# Patient Record
Sex: FEMALE | Race: OTHER | Employment: UNEMPLOYED | URBAN - METROPOLITAN AREA
[De-identification: names, ages, dates, MRNs, and addresses within clinical notes are randomized per-mention and may not be internally consistent; named-entity substitution may affect disease eponyms.]

---

## 2019-07-23 ENCOUNTER — OFFICE VISIT (OUTPATIENT)
Dept: FAMILY MEDICINE CLINIC | Age: 69
End: 2019-07-23

## 2019-07-23 VITALS
BODY MASS INDEX: 36.12 KG/M2 | HEIGHT: 60 IN | RESPIRATION RATE: 16 BRPM | WEIGHT: 184 LBS | TEMPERATURE: 97.7 F | DIASTOLIC BLOOD PRESSURE: 87 MMHG | HEART RATE: 76 BPM | OXYGEN SATURATION: 95 % | SYSTOLIC BLOOD PRESSURE: 131 MMHG

## 2019-07-23 DIAGNOSIS — G56.02 CARPAL TUNNEL SYNDROME OF LEFT WRIST: ICD-10-CM

## 2019-07-23 DIAGNOSIS — G56.02 MEDIAN NERVE COMPRESSION AT ELBOW, LEFT: Primary | ICD-10-CM

## 2019-07-23 RX ORDER — MECLIZINE HCL 25MG 25 MG/1
TABLET, CHEWABLE ORAL
COMMUNITY

## 2019-07-23 RX ORDER — ALPRAZOLAM 0.25 MG/1
TABLET ORAL
COMMUNITY

## 2019-07-23 RX ORDER — SIMVASTATIN 20 MG/1
TABLET, FILM COATED ORAL
COMMUNITY
End: 2019-07-30 | Stop reason: SDUPTHER

## 2019-07-23 RX ORDER — HYDROCHLOROTHIAZIDE 25 MG/1
25 TABLET ORAL DAILY
COMMUNITY
End: 2019-07-30 | Stop reason: SDUPTHER

## 2019-07-23 NOTE — PROGRESS NOTES
Chief Complaint   Patient presents with   [facility] Care     1. Have you been to the ER, urgent care clinic since your last visit? Hospitalized since your last visit? no    2. Have you seen or consulted any other health care providers outside of the [address] [place] [address] since your last visit? Include any pap smears or colon screening. no      Pain on left arm--radiating from shoulder down.  ---elbow seems to bother her the most    Mammogram--5 years ago--normal    Bone density--never had one    Pneumococcal/shingles--has had both--2 years ago    Colonoscopy--4 years ago--normal

## 2019-07-23 NOTE — PATIENT INSTRUCTIONS
Síndrome del tiffanie hartmann: Instrucciones de cuidado - [ Carpal Tunnel Syndrome: Care Instructions ]  Instrucciones de cuidado    El síndrome del tiffanie hartmann es un problema nervioso. Puede causar hormigueo, entumecimiento, debilidad o [employer] dedos, el pulgar y la [place]. El nervio mediano y varios tejidos fibrosos, llamados tendones, atraviesan la colt por un espacio denominado tiffanie hartmann. El movimiento repetido de las albert al trabajar o practicar ciertos pasatiempos y deportes puede hacer presión sobre el nervio. El embarazo y ciertas afecciones médicas, jere la diabetes, la artritis y [facility] tiroides hipoactiva, también pueden causar el síndrome del tiffanie hartmann. Para reducir los síntomas, usted podría limitar [facility] o [facility]. También puede evelio otras medidas para sentirse mejor. Si los síntomas son leves, es probable que pueda aliviar el dolor con 1 o 2 semanas de tratamiento en el hogar. La cirugía es necesaria solo si otros tratamientos no funcionan. La atención de seguimiento es yunior parte clave de dean tratamiento y seguridad. Asegúrese de hacer y acudir a todas las citas, y llame a dean médico si está teniendo problemas. También es yunior buena idea saber los resultados de stacie exámenes y mantener yunior lista de los medicamentos que luda. ¿Cómo puede cuidarse en el hogar? · Si es posible, interrumpa o disminuya la actividad que causa los síntomas. Si no puede suspenderla, joseph pausas frecuentes para descansar y estirarse o cambie la posición de las albert para hacer yunior tarea. Trate de [place] [facility], jere cuando [place] el ratón de yunior computadora. · Trate de evitar doblar o rotar las muñecas. · Pregúntele a dean médico si puede evelio un analgésico (medicamento para el dolor) de venta felipa, jere acetaminofén (Tylenol), ibuprofeno (Advil, Motrin) o naproxeno (Aleve). Sea pastora con los medicamentos. Nohemy y siga todas las instrucciones de la [place].   · Si dean médico le receta corticosteroides para ayudar a aliviar el dolor y reducir la hinchazón, tómelos exactamente jere le fueron recetados. Llame a dean médico si rox estar teniendo problemas con dean medicamento. · Colóquese hielo o yunior compresa fría sobre la [employer] de 10 a 20 minutos cada vez para aliviar el dolor. Póngase un paño labmert entre el hielo y la piel. · Si dean médico o dean fisioterapeuta o terapeuta ocupacional le indica que use yunior tablilla (férula) para la [employer], [place] según las indicaciones para mantener la [employer] en yunior posición neutra. [place] también reduce la presión sobre dean nervio mediano. · Pregúntele a dean médico si debería hacer sesiones de fisioterapia o de terapia ocupacional para aprender a hacer las tareas de [employer]. · [place] clases de yoga para estirar los músculos y fortalecer las albert y las [place]. El yoga ha [employer] síntomas del túnel carpiano. Cómo prevenir el síndrome del túnel carpiano  · Cuando trabaje en la computadora, Osiel Nelson 31 y las muñecas alineadas con los antebrazos. Mantenga los codos cerca de stacie costados. [place] un descanso con yunior frecuencia de 10 a 15 minutos. · Trate de hacer los siguientes ejercicios:  ? Calentamiento: Gire la [place], Sasha Radha y de un lado a otro. Repita esto 4 veces. Estire [employer] dedos, relájelos y vuelva a estirarlos. Repita 4 veces. Estire el pulgar doblándolo levemente hacia atrás, manténgalo en rocio posición y relájelo. Repita 4 veces. ? Estiramiento con los [employer] en posición de orar: Comience colocando las lita de las albert juntas shruti del [place], sona debajo del [place]. Baje las albert lentamente hacia la línea de la cintura y manténgalas cerca del estómago con las lita juntas hasta que sienta un estiramiento leve a moderado debajo de los antebrazos. Mantenga la posición entre 10 y 21 segundos. Repita 4 veces.   ? Estiramiento del flexor de la colt: Extienda el [place] frente a usted, con la urena [place] [facility]. Doble la colt y apunte con la mano hacia el piso. Con la [employer], doble con suavidad la colt aún más hasta que sienta un estiramiento entre leve y moderado en el antebrazo. Mantenga la posición entre 10 y 21 segundos. Repita 4 veces. ? Estiramiento del extensor de la colt: Repita los pasos para el estiramiento del flexor de la colt anton comience con la urena extendida Kalmita K. · [employer]ete yunior pelota de goma varias veces al día para mantener jossie las albert y los dedos. · Evite sostener objetos (jere un libro) en la misma posición chriss mucho tiempo. Siempre que sea posible, utilice toda la mano para evelio un objeto. Si [place] solo el pulgar y el dedo índice puede tensionar la [place] 1. · No fume. Puede empeorar esta afección ya que reduce el flujo de jenae hacia el nervio El paso. Si necesita ayuda para dejar de fumar, hable con dean médico sobre programas y medicamentos para dejar de fumar. Estos pueden aumentar stacie probabilidades de dejar el hábito para siempre. ¿Cuándo debe pedir ayuda? Preste especial atención a los cambios de dean huey y asegúrese de comunicarse con dean médico si:    · El dolor u otros problemas no mejoran con los cuidados en el hogar.     · Desea más información sobre la fisioterapia o la terapia ocupacional.     · Tiene efectos secundarios producidos por los corticosteroides, tales jere:  ? Aumento de [place]. ? Cambios en el estado de ánimo. ? Problemas para dormir. ? Fácil formación de moretones.     · Tiene otros problemas con los medicamentos. ¿Dónde puede encontrar más información en inglés? Olga Wan a http://kiesha-eleuterio.info/. Vera Bucker R432 en la búsqueda para aprender más acerca de \"Síndrome del túnel carpiano: Instrucciones de cuidado - [ Carpal Tunnel Syndrome: Care Instructions ]. \"  Revisado: 20 septiembre, 2018  Versión del contenido: 12.1  © 2452-5880 Healthwise, Incorporated.  Las instrucciones de cuidado fueron adaptadas bajo licencia por [employer] (which disclaims liability or warranty for this information). Si usted tiene [place] afección médica o sobre estas instrucciones, siempre pregunte a kahn profesional de huey. [facility], Incorporated niega toda garantía o responsabilidad por kahn uso de esta información. Codo: Ejercicios - [ Elbow: Exercises ]  Instrucciones de cuidado  Aquí se presentan algunos ejemplos de ejercicios para el codo. Empiece cada ejercicio lentamente. Reduzca la intensidad del ejercicio si Elian Kathy a tener dolor. Kahn médico o fisioterapeuta le dirán cuándo puede comenzar con estos ejercicios y cuáles funcionarán mejor para usted. Cómo hacer los ejercicios  Estiramiento del flexor de la oclt    1. Extienda el brazo shruti de usted con la urena Filomena Harriett alvareziba. 2. Doble la colt y apunte con la mano hacia el piso. 3. Con la otra mano, doble con suavidad la colt aún más hasta que sienta un estiramiento entre leve y moderado en el antebrazo. 4. Sosténgalo por lo menos de 15 a 30 segundos. Repita de 2 a 4 veces. Estiramiento del extensor de la colt    1. Repita los pasos del 1 al 4 del estiramiento anterior, anton comience con la urena de la mano extendida [place] K. Apretar yunior pelota o un calcetín    1. Sostenga ynuior pelota de tenis (o un calcetín enrollado) en la mano. 2. Cierre el puño alrededor de la pelota (o el calcetín) y apriételo. 3. [address] [place] [address] Road unos 6 segundos y luego relájelo chriss 10 segundos. 4. Repita de 8 a 12 veces. 5. Cambie la pelota (o el calcetín) a la otra mano y joseph lo mismo de 8 a 12 veces. Desviación de la colt    1. Siéntese de Courtney Malikid que kahn brazo quede apoyado anton kahn mano cuelgue del borde de yunior superficie plana, jere yunior mckeon. 2. Extienda la mano jere si le estuviera dando la mano a alguien. 3. Mueva la mano hacia judah y Juan K. 4. Repita kyle movimiento de 8 a 12 veces. 5. Cambie de brazo.   6. Trate de [employer] ejercicio dos veces con cada mano. Flexiones con la colt    1. Coloque el antebrazo en yunior mckeon con la mano colgando sobre el borde de la mckeon y la urena [place] arriba. 2. Coloque yuinor pesa de 1 a 2 libras (0.5 a 1 kg) en la mano. Ésta puede ser Antonette Saas pesa tipo [facility], yunior price de comida o yunior botella llena de agua. 3. Lentamente levante y baje la pesa, manteniendo el antebrazo en la mckeon y la urena [place] ar[place]. 4. Repita kyle movimiento de 8 a 12 veces. 5. Cambie de brazo y [employer] del 1 al 4.  6. Repítalo con la mano hacia abajo, [place]. Cambie de brazo. Flexiones de bíceps    1. Siéntese inclinado hacia adelante, con las piernas ligeramente separadas y la mano izquierda sobre el muslo dena. 2. Coloque el codo derecho en dean muslo derecho y sostenga la pesa con dean antebrazo en posición horizontal.  3. Doble lentamente la pesa [place] arriba y [place]. 4. Repita kyle movimiento de 8 a 12 veces. 5. Cambie de brazo y [employer] del 1 al 4. La atención de seguimiento es yunior parte clave de dean tratamiento y seguridad. Asegúrese de hacer y acudir a todas las citas, y llame a dean médico si está teniendo problemas. También es yunior buena idea saber los resultados de stacie exámenes y mantener yunior lista de los medicamentos que luda. ¿Dónde puede encontrar más información en inglés? Roscoe Dominguez a http://kiesha-eleuterio.info/. Avel Katz P520 en la búsqueda para aprender más acerca de \"Codo: Ejercicios - [ Elbow: Exercises ]. \"  Revisado: 20 septiembre, 2018  Versión del contenido: 12.1  © 9630-4043 Healthwise, [place]. Las instrucciones de cuidado fueron adaptadas bajo licencia por [employer] (which disclaims liability or warranty for this information). Si usted tiene [place] afección médica o sobre estas instrucciones, siempre pregunte a dean profesional de huey.  [employer], Friday niega toda garantía o responsabilidad por dean uso de esta información. Uso de yunior tablilla: Instrucciones de cuidado  [Wearing a Splint: Care Instructions]  Instrucciones de cuidado     [place] tablilla (férula) protege un hueso roto u otra Demar Jeffrey. Si tiene yunior tablilla removible, siga las instrucciones de dean médico y [place] solo si el médico le indica que puede [place]. La mayoría de las tablillas se pueden ajustar. El médico le mostrará cómo hacerlo y le dirá cuándo puede ser necesario que usted ajuste la tablilla. Muchas tablillas vienen ya hechas. Dean médico podría [employer] tablilla a base de yeso o de fibra de renee. Algunas tablillas tienen incorporada yunior almohadilla de aire. Las almohadillas de aire se inflan para mantener la michaelle lesionada en dean lugar. La atención de seguimiento es yunior parte clave de dean tratamiento y seguridad. Asegúrese de hacer y acudir a todas las citas, y llame a dean médico si está teniendo problemas. También es yunior buena idea saber los resultados de stacie exámenes y mantener yunior lista de los medicamentos que luda. ¿Cómo puede cuidarse en el hogar? Cuidado general  · No ponga peso sobre la tablilla. Si lleva yunior bota ortopédica, el médico le indicará cuándo puede poner peso [place] Para. · Si los dedos de la mano o el pie del miembro que lleva la tablilla no están lesionados, muévalos de vez en cuando. [place] ayuda a que [employer] y los líquidos del miembro lesionado. · Eleve la pierna o el brazo lesionado sobre yunior almohada cuando le aplique hielo o en cualquier momento en que esté sentado o acostado chriss los 3 días siguientes. Trate de mantenerlo por encima del nivel del corazón. [place] ayudará a reducir la hinchazón. · Aplíquese hielo o compresas frías en la michaelle lesionada de 10 a 20 minutos cada vez. Trate de hacerlo cada 1 o 2 horas chriss los siguientes 3 días (cuando esté despierto) o hasta que baje la hinchazón. Tenga cuidado de no mojar la tablilla. · Sea pastora con los medicamentos.  Nohemy y siga todas las instrucciones de la etiqueta. ¨ Si el médico le recetó un analgésico (medicamento para el dolor), tómelo según las indicaciones. ¨ Si no está tomando un analgésico recetado, pregúntele a dean médico si puede evelio rena de [place]. · Mantenga dean fuerza y albertina muscular tanto jere le sea posible a la vez que protege dean miembro o articulación lesionados. Es posible que el médico le indique que tense y relaje los músculos que están protegidos por la tablilla. Consulte con dean médico o con dean fisioterapeuta o terapeuta ocupacional para recibir instrucciones. Cómo cuidar de la tablilla y de la piel  · Si dean tablilla no se judith, trate de soplar aire fresco dentro de la tablilla con un secador de pelo o un ventilador para ayudar a aliviar la picazón. Nunca introduzca objetos debajo de la tablilla para rascarse la piel. · No use aceites ni lociones cerca de la tablilla. Si le duele o se le enrojece la piel alrededor del borde de la tablilla, puede acolchar los bordes con un material [place], jere rolan \"moleskin\", o usar cinta para cubrirlos. · Si tiene permiso para quitarse la tablilla, asegúrese de que la piel esté seca antes de volver a ponérsela. Tenga cuidado de no ponerse la tablilla demasiado apretada. El agua y dean tablilla  · Mantenga seca la tablilla. La humedad se puede acumular debajo de la tablilla y causar irritación y picazón en la piel. Si tiene Colie Johan herida o tuvo yunior operación, tener humedad bajo la tablilla puede aumentar el riesgo de [place]. · Cubra la tablilla con plástico y sujételo con cinta adhesiva cuando se bañe o se duche, excepto si el médico le ha dicho que se la puede quitar cuando se bañe. · Si se puede quitar la tablilla chriss el baño, seque la michaelle con ligeras palmaditas después de bañarse y vuelva a ponerse la tablilla. · Si la tablilla se moja un poco, puede secarla con un secador de pelo. Utilice el nivel de aire frío (\"cool\"). ¿Cuándo debe pedir ayuda?   Llame a dean médico ahora mismo o busque atención médica inmediata si:  · Tiene más dolor o dolor intenso. · Siente yunior michaelle caliente o adolorida debajo de la tablilla. · Tiene problemas con la tablilla. Por ejemplo:  ¨ Siente ardor o escozor en la piel cubierta por la tablilla. ¨ La tablilla se siente demasiado apretada. ¨ Tiene mucha hinchazón cerca de la tablilla. (Algo de hinchazón es normal). ¨ Tiene fiebre nueva. ¨ Hay secreción o mal Verizon de la tablilla. · La mano o el pie está frío o pálido, o cambia de color. · Tiene problemas para  los dedos de la mano o del pie. · Tiene síntomas de un coágulo de jenae en el brazo o la pierna (llamado trombosis venosa profunda). Estos pueden incluir:  ¨ Dolor en el brazo, la pantorrilla, detrás de la rodilla, en el muslo o en la sera. ¨ Enrojecimiento e hinchazón en el brazo, la pierna o la sera. Vigile muy de cerca los cambios en dean huey, y asegúrese de comunicarse con dean médico si:  · La tablilla se está rompiendo o está perdiendo dean forma. · No mejora jere se esperaba. ¿Dónde puede encontrar más información en inglés? Vaya a DealExplorer.be  Ashley E815 en la búsqueda para aprender más acerca de \"Uso de yunior tablilla: Instrucciones de cuidado. \"   © 3538-8178 Healthwise, Incorporated. Instrucciones de cuidado adaptadas bajo licencia por [facility] (which disclaims liability or warranty for this information). Estas instrucciones de cuidado son para usarlas con dean profesional clínico registrado. Si tiene preguntas acerca de yunior afección médica o de estas instrucciones, pregunte siempre a dean profesional de [employer]. Healthwise, Incorporated niega cualquier garantía o responsabilidad por dean uso de esta información.   Versión del contenido: [id number]; Revisado: 16 agosto, 2015

## 2019-07-23 NOTE — PROGRESS NOTES
Chief Complaint   Patient presents with    Establish Care   :    HPI  Rachael Crigler is a 68 y.o. female who presents with c/o left elbow pain,  acute onset, 3 months and stable. the context: no trauma. No change in activity  Pain anterior side of elbow, intensity 10/10 , sharp, with radiation from shoulder to tip of finger, worsened by supination,. Pain comes only with movement. and relieved by rest. Associated symptoms are : things falling of hand sometimes, numbness. Patient has tried Aleeve with mild relief. Patient denies fever, chills , neck pain, CP/ SOB/ N/V/ diarrhea. Allergies - reviewed: Allergies   Allergen Reactions    Penicillins Itching       Meds - reviewed:   Current Outpatient Medications   Medication Sig Dispense Refill    meclizine (ANTIVERT) 25 mg chewable tablet Take  by mouth three (3) times daily as needed.  ALPRAZolam (XANAX) 0.25 mg tablet Take  by mouth.  hydroCHLOROthiazide (HYDRODIURIL) 25 mg tablet Take 25 mg by mouth daily.  simvastatin (ZOCOR) 20 mg tablet Take  by mouth nightly. PMH- reviewed:  Past Medical History:   Diagnosis Date    Anxiety     Hypercholesterolemia     Hypertension        SH- reviewed:  Smoker:  Social History     Tobacco Use   Smoking Status Not on file       ETOH- reviewed:   Social History     Substance and Sexual Activity   Alcohol Use Not on file       FH- reviewed:   No family history on file. ROS: Positive for Items in bold:   Constitutional: headache, fever, fatigue, weight loss or weight gain      Cardiac: chest pain      : frequency, urgency, dysuria, hematuria   Neuro: dizziness, numbness in hand, tingling  Psych: anxiety, depression, stress     Physical Exam:  Visit Vitals  /87   Pulse 76   Temp 97.7 °F (36.5 °C) (Oral)   Resp 16   Ht 5' (1.524 m)   Wt 184 lb (83.5 kg)   SpO2 95%   BMI 35.94 kg/m²       Gen: No apparent distress. Alert and oriented and responds to all questions appropriately. Neck: Supple; full ROM. No cervical spine tenderness. Neurologic:Coordination and gait grossly intact. LUExt: normal strength, and sensation grossly intact. Tenderness palpation on marcelino medial side of left. Elbow. Tinnel positive in the 1st 3 lateral fingers. DTR 2+ Well perfused. No edema. Skin: No obvious rashes. I have personally reviewed the labs results    HM reviewed and summarized below:   Mammogram--5 years ago--normal  Bone density--never had one  Pneumococcal/shingles--has had both--2 years ago  Colonoscopy--4 years ago--normal      Assessment and Plan:   Silvino Soto is a 68 y.o. female who presents for left elbow pain. Likely median nerve compression. Will treat conservatively for now. will give some home exercise. Advised wrist splints. Consider EMG/NCS. PT referrla given. Will follow with sport medicine       ICD-10-CM ICD-9-CM    1. Median nerve compression at elbow, left G56.02 354.1 REFERRAL TO PHYSICAL THERAPY   2. Carpal tunnel syndrome of left wrist G56.02 354.0 REFERRAL TO PHYSICAL THERAPY       willl obtain medical records from previous PCP    Discussed diagnoses in detail with patient. Patient expressed understanding of and agreement to above plan. All questions and concerns addressed. Medication risks/benefits/side effects discussed with patient. Patient is counseled to return to the office and/or ED if symptoms do not improve as expected. Patient  discussed with Dr. Mason Kirby, Attending Physician. Lydia Dumont MD  07/23/19    Family Medicine Resident

## 2019-07-30 ENCOUNTER — OFFICE VISIT (OUTPATIENT)
Dept: FAMILY MEDICINE CLINIC | Age: 69
End: 2019-07-30

## 2019-07-30 VITALS
TEMPERATURE: 98 F | HEART RATE: 64 BPM | OXYGEN SATURATION: 97 % | WEIGHT: 184 LBS | HEIGHT: 60 IN | BODY MASS INDEX: 36.12 KG/M2 | SYSTOLIC BLOOD PRESSURE: 131 MMHG | DIASTOLIC BLOOD PRESSURE: 82 MMHG | RESPIRATION RATE: 18 BRPM

## 2019-07-30 DIAGNOSIS — M25.522 LEFT ELBOW PAIN: ICD-10-CM

## 2019-07-30 DIAGNOSIS — G56.02 CARPAL TUNNEL SYNDROME OF LEFT WRIST: Primary | ICD-10-CM

## 2019-07-30 RX ORDER — HYDROCHLOROTHIAZIDE 25 MG/1
25 TABLET ORAL DAILY
Qty: 30 TAB | Refills: 0 | Status: SHIPPED | OUTPATIENT
Start: 2019-07-30 | End: 2019-09-17 | Stop reason: SDUPTHER

## 2019-07-30 RX ORDER — SIMVASTATIN 20 MG/1
20 TABLET, FILM COATED ORAL
Qty: 30 TAB | Refills: 0 | Status: SHIPPED | OUTPATIENT
Start: 2019-07-30 | End: 2019-09-17 | Stop reason: SDUPTHER

## 2019-07-30 NOTE — PROGRESS NOTES
History of Present Illness     Patient Identification  Silvino Soto is a 68 y.o. female complains of pain in the left elbow. Pt states the pain started 3 months ago and and radiates down to her fingers w/ numbness and tingling in the first 3 digits. Pain is located at the medial epicondyle and worse with pronation and supination. Has tried aleve and icy hot which provides temporary relief. Denies trauma, edema, erythema, or weakness. Past Medical History:   Diagnosis Date    Anxiety     Hypercholesterolemia     Hypertension      No family history on file. Current Outpatient Medications   Medication Sig Dispense Refill    meclizine (ANTIVERT) 25 mg chewable tablet Take  by mouth three (3) times daily as needed.  ALPRAZolam (XANAX) 0.25 mg tablet Take  by mouth.  hydroCHLOROthiazide (HYDRODIURIL) 25 mg tablet Take 25 mg by mouth daily.  simvastatin (ZOCOR) 20 mg tablet Take  by mouth nightly. Allergies   Allergen Reactions    Penicillins Itching       Review of Systems  Pertinent items are noted in HPI. Physical Exam     There were no vitals taken for this visit. GEN: Well appearing. No apparent distress. Responds to all questions appropriately. Lungs: No labored respirations. Talking in complete sentences without difficulty.   Elbow/Wrist: left    Elbow/wrist Effusion: None  Deformity: None     ROM: FROM at elbow and wrist    Palpation:   TFCC tenderness: None   Ulna styloid tenderness: None   Distal radius tenderness: None   Medial epicondyle tenderness: positive   Lateral epicondyle tenderness: None    Strength (0-5/5):   :    Left: 5/5  Right: 5/5   Wrist Extension:  Left: 5/5  Right: 5/5   Wrist Flexion:  Left: 5/5 Pain over the medial elbow  Right: 5/5   Elbow Flextion: Left: 5/5  Right: 5/5   Elbow Extension:  Left: 5/5  Right: 5/5   Forearm Pronation:  Left: 5/5 Pain in the anterior elbow with testing but no radiation sx  Right: 5/5    Forearm Supination: Left: 5/5  Right: 5/5    Other test:  Wrist:  Phalens test: Positive  Tinels test: Positive    Elbow:  Phalens test: Positive  Tinels test: negative    Assessment:  Elbow pain: Possible medial epicondylitis. Pronator teres syndrome also a possibility. Hand Wrist Pain: Possible CTS. Could also be related to pronator teres syndrome. Plan:  1. Home Exercise Program as per handout. 2. Ice 15 minutes, three times a day PRN and after exercise. Can alternate with heat for 15 minutes. 3. Wrist brace     Medications:    1. Naproxin (Aleve): 220mg 1-2 tablets twice a day PRN. 2. Acetaminophen (Tylenol):  500mg 1-2 tablets every 6 hours as needed for pain.     RTC: 4 weeks

## 2019-08-09 ENCOUNTER — HOSPITAL ENCOUNTER (OUTPATIENT)
Dept: LAB | Age: 69
Discharge: HOME | End: 2019-08-09
Payer: MEDICARE

## 2019-08-09 ENCOUNTER — OFFICE VISIT (OUTPATIENT)
Dept: FAMILY MEDICINE CLINIC | Age: 69
End: 2019-08-09

## 2019-08-09 VITALS
TEMPERATURE: 98.3 F | SYSTOLIC BLOOD PRESSURE: 111 MMHG | BODY MASS INDEX: 36.32 KG/M2 | RESPIRATION RATE: 18 BRPM | DIASTOLIC BLOOD PRESSURE: 74 MMHG | OXYGEN SATURATION: 97 % | HEART RATE: 76 BPM | WEIGHT: 185 LBS | HEIGHT: 60 IN

## 2019-08-09 DIAGNOSIS — M25.522 LEFT ELBOW PAIN: ICD-10-CM

## 2019-08-09 DIAGNOSIS — G56.02 CARPAL TUNNEL SYNDROME OF LEFT WRIST: ICD-10-CM

## 2019-08-09 DIAGNOSIS — R42 DIZZINESS: ICD-10-CM

## 2019-08-09 DIAGNOSIS — I10 ESSENTIAL HYPERTENSION: ICD-10-CM

## 2019-08-09 DIAGNOSIS — M77.02 MEDIAL EPICONDYLITIS OF LEFT ELBOW: ICD-10-CM

## 2019-08-09 DIAGNOSIS — E78.2 MIXED HYPERLIPIDEMIA: ICD-10-CM

## 2019-08-09 DIAGNOSIS — Z00.00 WELL WOMAN EXAM (NO GYNECOLOGICAL EXAM): Primary | ICD-10-CM

## 2019-08-09 DIAGNOSIS — Z13.1 SCREENING FOR DIABETES MELLITUS: ICD-10-CM

## 2019-08-09 PROCEDURE — 36415 COLL VENOUS BLD VENIPUNCTURE: CPT

## 2019-08-09 PROCEDURE — 85025 COMPLETE CBC W/AUTO DIFF WBC: CPT

## 2019-08-09 PROCEDURE — 80061 LIPID PANEL: CPT

## 2019-08-09 PROCEDURE — 80053 COMPREHEN METABOLIC PANEL: CPT

## 2019-08-09 PROCEDURE — 83036 HEMOGLOBIN GLYCOSYLATED A1C: CPT

## 2019-08-09 PROCEDURE — 86803 HEPATITIS C AB TEST: CPT

## 2019-08-09 NOTE — PROGRESS NOTES
Chief Complaint   Patient presents with   [facility] Elbow Pain     [facility]  # [id number]     5. Have you been to the ER, urgent care clinic since your last visit? Hospitalized since your last visit? No    2. Have you seen or consulted any other health care providers outside of the [address] [place] [address] since your last visit? Include any pap smears or colon screening. No     Reviewed record in preparation for visit and have obtained necessary documentation.

## 2019-08-09 NOTE — PATIENT INSTRUCTIONS
Visita de control para mujeres  Instrucciones de cuidado - [ Well Visit, Women 48 to 72: Care Instructions ]  Instrucciones de cuidado    Los exámenes físicos pueden ayudarla a mantenerse saludable. Dean médico dubose revisado dean estado general de huey y podría haberle dado algunos consejos para cuidarse. También podría haberle recomendado otros exámenes. En dean hogar, usted puede ayudar a prevenir enfermedades si come de manera saludable, hace ejercicio con regularidad y sigue otras recomendaciones. La atención de seguimiento es yunior parte clave de dean tratamiento y seguridad. Asegúrese de hacer y acudir a todas las citas, y llame a dean médico si está teniendo problemas. También es yunior buena idea saber los resultados de stacie exámenes y mantener yunior lista de los medicamentos que luda. ¿Cómo puede cuidarse en el hogar? · Alcance un peso saludable y [place]. [place] disminuirá el riesgo de tener [employer], tales jere obesidad, diabetes, enfermedad cardíaca y presión arterial colette. · Nicole al menos 30 minutos de ejercicio la mayoría de los días de la Nursery. Caminar es yunior buena opción. Es posible que prefiera hacer otras actividades, jere correr, nadar, [employer], o jugar al tenis u otros deportes de equipo. · No fume. Fumar puede [employer] problemas de la huey. Si necesita ayuda para dejar de fumar, hable con dean médico sobre programas y medicamentos para dejar de fumar. Pueden aumentar stacie probabilidades de dejar el hábito para siempre. · Protéjase la piel del exceso de sol. [address] 10 a.m. y las 4 p.m., permanezca a la varun o Mcdonnell Cheng con prendas de vestir y un sombrero de ala ancha. Use gafas de sol que bloqueen los gokul ultravioleta. Póngase un protector solar de amplio espectro (SPF 30 o superior) en la piel expuesta, incluso cuando esté nublado. · Acuda al dentista ДМИТРИЙ [facility] veces al año para hacerse chequeos y limpiezas dentales.   · En el automóvil, use el cinturón de seguridad. Siga las recomendaciones de dean médico acerca de cuándo hacerse determinadas pruebas. Estas pruebas pueden detectar problemas temprano. · Colesterol. Dean médico le avisará con qué frecuencia debe hacerse esta prueba según dean edad, stacie antecedentes familiares y otros factores que puedan incrementar el riesgo de ataque al corazón y ataque cerebral.  · Presión arterial. Hágase evelio la presión arterial chriss yunior visita de rutina al médico. Dean médico le dirá con qué frecuencia debe revisarse la presión arterial según dean edad, los Early de dean presión arterial y otros factores. · Mamografía. Pregúntele a dean médico con qué frecuencia debe hacerse [employer], la cual es yunior radiografía (gokul X) de los senos (mamas). Yunior mamografía puede detectar el cáncer de seno antes de que pueda palparse y en la etapa en que es más fácil de tratar. · Prueba de Papanicolaou y examen pélvico. Pregúntele a dean médico con qué frecuencia debe hacerse yunior prueba de Papanicolaou. Es posible que no tenga que hacerse yunior prueba de Papanicolaou con la misma frecuencia de antes. · Visión. Hágase un chequeo de la visión cada rena o [address], o con la frecuencia que dean médico sugiera. Algunos expertos recomiendan que se joseph exámenes anuales para detectar glaucoma u otros problemas de la visión relacionados con la edad después de los 50 Los dianne. · Audición. Avísele a dean médico si nota algún cambio en dean audición. Usted puede hacerse pruebas para saber si oye siobhan. · Diabetes. Pregúntele a dean médico si debería hacerse pruebas para la diabetes. · Cáncer colorrectal. Dean riesgo de cáncer colorrectal aumenta a medida que envejece. Algunos especialistas dicen que los adultos deberían comenzar a hacerse pruebas de detección regulares a los 48 años de edad y dejar de hacérselas a los 76 años. Otros dicen que hay que comenzar antes de los 48 años o continuar después de los 76 años de edad.  Hable con dean médico acerca de dean riesgo y de cuándo comenzar y dejar de hacerse pruebas de detección. · Enfermedad de la glándula tiroidea. Hable con kahn médico acerca de si debe examinarse la glándula tiroidea jere parte de kahn examen físico de rutina. Las [employer] tienen más probabilidad de tener problemas con la glándula tiroidea. · Osteoporosis. Edward Pintos a hacerse pruebas de densidad ósea a los 72 años. Si es yony de [facility], pregúntele a kahn médico si tiene factores que pueden aumentar el riesgo de esta enfermedad. [employer] prueba antes de los 72 Los dianne. · Riesgo de ataque al corazón y ataque cerebral. Con yunior frecuencia de al menos 4 a 6 años, debería hacerse evaluar kahn riesgo de tener un ataque al corazón y un ataque cerebral. Kahn médico tiene en cuenta factores jere la edad, la presión arterial, el colesterol, si es fumador o si tiene diabetes para mostrar cuál es kahn riesgo de tener un ataque al corazón o un ataque cerebral en los próximos 10 años. ¿Cuándo debe pedir ayuda? Preste especial atención a los cambios en kahn huey y asegúrese de comunicarse con kahn médico si tiene problemas o síntomas que le preocupan. ¿Dónde puede encontrar más información en inglés? Jared Espinoza a http://kiesha-eleuterio.info/. Sonu Avalos I231 en la búsqueda para aprender más acerca de \"Visita de control para mujeres de 50 a 72 años: Instrucciones de cuidado - [ Well Visit, Women 48 to 72: Care Instructions ]. \"  Revisado: 13 glen, 2018  Versión del contenido: 12.1  © 3216-3019 Healthwise, Incorporated. Las instrucciones de cuidado fueron adaptadas bajo licencia por [employer] (which disclaims liability or warranty for this information). Si usted tiene [place] afección médica o sobre estas instrucciones, siempre pregunte a kahn profesional de huey. [facility] niega toda garantía o responsabilidad por kahn uso de esta información.

## 2019-08-09 NOTE — PROGRESS NOTES
Chief Complaint   Patient presents with    Elbow Pain     [facility]  # [id number]   :    JONE  Shashi Dukes is a 68 y.o. female who presents to follow up on left elbow pain. CTS vs medial epicondylitis. Wrist splintis helping. No change in activity  Pain anterior side of elbow, intensity down from 10- 6/10. Patient has tried Aleeve with mild relief. Patient denies fever, chills , neck pain, CP/ SOB/ N/V/ diarrhea. diet: regular. exercise: walks 30 min daily. last 3 paps were within normal limits  Does not check BP at home. No recent falls, but describes intermittent random dizziness as room spinning, ongoing for 10 years, about once a month. Loves to watch TV      Allergies - reviewed: Allergies   Allergen Reactions    Penicillins Itching       Meds - reviewed:   Current Outpatient Medications   Medication Sig Dispense Refill    pneumococcal 13 derrick conj dip (PREVNAR-13) 0.5 mL syrg injection 0.5 mL by IntraMUSCular route once for 1 dose. 0.5 mL 0    varicella-zoster recombinant, PF, (SHINGRIX, PF,) 50 mcg/0.5 mL susr injection 0.5 mL by IntraMUSCular route once for 1 dose. 0.5 mL 0    hydroCHLOROthiazide (HYDRODIURIL) 25 mg tablet Take 1 Tab by mouth daily. 30 Tab 0    simvastatin (ZOCOR) 20 mg tablet Take 1 Tab by mouth nightly. 30 Tab 0    meclizine (ANTIVERT) 25 mg chewable tablet Take  by mouth three (3) times daily as needed.  ALPRAZolam (XANAX) 0.25 mg tablet Take  by mouth. PMH- reviewed:  Past Medical History:   Diagnosis Date    Anxiety     Hypercholesterolemia     Hypertension        SH- reviewed:  Smoker:  Social History     Tobacco Use   Smoking Status Never Smoker   Smokeless Tobacco Never Used       ETOH- reviewed:   Social History     Substance and Sexual Activity   Alcohol Use Never    Frequency: Never       FH- reviewed:   No family history on file.       ROS: Positive for Items in bold:   Constitutional: headache, fever, fatigue, weight loss or weight gain      Cardiac: chest pain      : frequency, urgency, dysuria, hematuria   Neuro: dizziness, numbness in hand, tingling  Psych: anxiety, depression, stress     Physical Exam:  Visit Vitals  /74 (BP 1 Location: Right arm, BP Patient Position: Sitting)   Pulse 76   Temp 98.3 °F (36.8 °C) (Oral)   Resp 18   Ht 5' (1.524 m)   Wt 185 lb (83.9 kg)   SpO2 97%   BMI 36.13 kg/m²       General  no distress, well developed, well nourished  HEENT  oropharynx clear and moist mucous membranes  Eyes  PERRL, EOMI and Conjunctivae Clear Bilaterally  Neck   full range of motion and supple  Respiratory  Clear Breath Sounds Bilaterally, No Increased Effort and Good Air Movement Bilaterally  Cardiovascular   RRR, S1S2, No murmur and Radial/Pedal Pulses 2+/=  Abdomen  soft, non tender and non distended  Skin  No Rash and Cap Refill less than 3 sec  Musculoskeletal no swelling or tenderness and strength normal and equal bilaterally  Neurologic:Coordination and gait grossly intact. LUExt: normal strength, and sensation grossly intact. Wearing wrist brace. Tenderness palpation on marcelino medial side of left. Elbow. Tinnel positive in the 1st 3 lateral fingers. DTR 2+ Well perfused. No edema. Skin: No obvious rashes. Assessment and Plan:   Saturnino Smyth is a 68 y.o. female who presents for follow up on  left elbow pain. Medial epicondilytis pronator teres syndrome vs  CTS improving. Continue conservative management, home exercise. Continue wrist splints. PT referral given had been given. Will follow with sport medicine   She wanted to do her wellness at the same time. Diet and exercise discussed Diet and exercise discussed. HM reviewed and updated. Patient encouraged to eat healthy (plenty of fruits and vegetables) and get regular cardiovascular exercise (at least 150 minutes per week). Stay active and limit screen time. Labs, immunization and screening as below.    Risk for fall due to dizziness: will RTC for dedicated evaluation for vertigo. Many need to stop Xanax Discussed fall prevention. Use a cane        ICD-10-CM ICD-9-CM    1. Well woman exam (no gynecological exam) Z00.00 V70.0 COLONOSCOPY      HEPATITIS C AB      TREVOR MAMMO BI SCREENING INCL CAD      REFERRAL TO OPHTHALMOLOGY      pneumococcal 13 derrick conj dip (PREVNAR-13) 0.5 mL syrg injection      varicella-zoster recombinant, PF, (SHINGRIX, PF,) 50 mcg/0.5 mL susr injection   2. Left elbow pain M25.522 719.42    3. Carpal tunnel syndrome of left wrist G56.02 354.0    4. Medial epicondylitis of left elbow M77.02 726.31    5. BMI 36.0-36.9,adult E80.74 F26.08 METABOLIC PANEL, COMPREHENSIVE      HEMOGLOBIN A1C WITH EAG   6. Essential hypertension K09 355.9 METABOLIC PANEL, COMPREHENSIVE      HEMOGLOBIN A1C WITH EAG      REFERRAL TO OPHTHALMOLOGY   7. Mixed hyperlipidemia E78.2 272.2 LIPID PANEL      HEMOGLOBIN A1C WITH EAG   8. Dizziness R42 780.4 CBC WITH AUTOMATED DIFF      HEMOGLOBIN A1C WITH EAG   9. Screening for diabetes mellitus Z13.1 V77.1 HEMOGLOBIN A1C WITH EAG         Discussed diagnoses in detail with patient. Patient expressed understanding of and agreement to above plan. All questions and concerns addressed. Medication risks/benefits/side effects discussed with patient. Patient is counseled to return to the office and/or ED if symptoms do not improve as expected. Patient  discussed with Dr. Yesica Deluna, Attending Physician. Lydia Dumont MD  08/09/19    Family Medicine Resident  Likes watching TV.

## 2019-08-10 LAB
ALBUMIN SERPL-MCNC: 4.1 G/DL (ref 3.5–4.8)
ALBUMIN/GLOB SERPL: 1.2 {RATIO} (ref 1.2–2.2)
ALP SERPL-CCNC: 97 IU/L (ref 39–117)
ALT SERPL-CCNC: 15 IU/L (ref 0–32)
AST SERPL-CCNC: 21 IU/L (ref 0–40)
BASOPHILS # BLD AUTO: 0.1 X10E3/UL (ref 0–0.2)
BASOPHILS NFR BLD AUTO: 1 %
BILIRUB SERPL-MCNC: <0.2 MG/DL (ref 0–1.2)
BUN SERPL-MCNC: 18 MG/DL (ref 8–27)
BUN/CREAT SERPL: 26 (ref 12–28)
CALCIUM SERPL-MCNC: 9.7 MG/DL (ref 8.7–10.3)
CHLORIDE SERPL-SCNC: 102 MMOL/L (ref 96–106)
CHOLEST SERPL-MCNC: 158 MG/DL (ref 100–199)
CO2 SERPL-SCNC: 24 MMOL/L (ref 20–29)
CREAT SERPL-MCNC: 0.7 MG/DL (ref 0.57–1)
EOSINOPHIL # BLD AUTO: 0.7 X10E3/UL (ref 0–0.4)
EOSINOPHIL NFR BLD AUTO: 6 %
ERYTHROCYTE [DISTWIDTH] IN BLOOD BY AUTOMATED COUNT: 14.5 % (ref 12.3–15.4)
EST. AVERAGE GLUCOSE BLD GHB EST-MCNC: 140 MG/DL
GLOBULIN SER CALC-MCNC: 3.5 G/DL (ref 1.5–4.5)
GLUCOSE SERPL-MCNC: 97 MG/DL (ref 65–99)
HBA1C MFR BLD: 6.5 % (ref 4.8–5.6)
HCT VFR BLD AUTO: 39.5 % (ref 34–46.6)
HCV AB S/CO SERPL IA: <0.1 S/CO RATIO (ref 0–0.9)
HDLC SERPL-MCNC: 52 MG/DL
HGB BLD-MCNC: 12.4 G/DL (ref 11.1–15.9)
IMM GRANULOCYTES # BLD AUTO: 0 X10E3/UL (ref 0–0.1)
IMM GRANULOCYTES NFR BLD AUTO: 0 %
INTERPRETATION, 910389: NORMAL
LDLC SERPL CALC-MCNC: 77 MG/DL (ref 0–99)
LYMPHOCYTES # BLD AUTO: 3.8 X10E3/UL (ref 0.7–3.1)
LYMPHOCYTES NFR BLD AUTO: 29 %
Lab: NORMAL
MCH RBC QN AUTO: 29.6 PG (ref 26.6–33)
MCHC RBC AUTO-ENTMCNC: 31.4 G/DL (ref 31.5–35.7)
MCV RBC AUTO: 94 FL (ref 79–97)
MONOCYTES # BLD AUTO: 0.8 X10E3/UL (ref 0.1–0.9)
MONOCYTES NFR BLD AUTO: 6 %
NEUTROPHILS # BLD AUTO: 7.7 X10E3/UL (ref 1.4–7)
NEUTROPHILS NFR BLD AUTO: 58 %
PLATELET # BLD AUTO: 374 X10E3/UL (ref 150–450)
POTASSIUM SERPL-SCNC: 4.8 MMOL/L (ref 3.5–5.2)
PROT SERPL-MCNC: 7.6 G/DL (ref 6–8.5)
RBC # BLD AUTO: 4.19 X10E6/UL (ref 3.77–5.28)
SODIUM SERPL-SCNC: 143 MMOL/L (ref 134–144)
TRIGL SERPL-MCNC: 147 MG/DL (ref 0–149)
VLDLC SERPL CALC-MCNC: 29 MG/DL (ref 5–40)
WBC # BLD AUTO: 13.1 X10E3/UL (ref 3.4–10.8)

## 2019-09-17 ENCOUNTER — OFFICE VISIT (OUTPATIENT)
Dept: FAMILY MEDICINE CLINIC | Age: 69
End: 2019-09-17

## 2019-09-17 VITALS
WEIGHT: 186.4 LBS | DIASTOLIC BLOOD PRESSURE: 84 MMHG | HEIGHT: 60 IN | OXYGEN SATURATION: 96 % | TEMPERATURE: 98.1 F | SYSTOLIC BLOOD PRESSURE: 124 MMHG | RESPIRATION RATE: 18 BRPM | BODY MASS INDEX: 36.6 KG/M2 | HEART RATE: 78 BPM

## 2019-09-17 DIAGNOSIS — I10 ESSENTIAL HYPERTENSION: ICD-10-CM

## 2019-09-17 DIAGNOSIS — E11.9 CONTROLLED TYPE 2 DIABETES MELLITUS WITHOUT COMPLICATION, WITHOUT LONG-TERM CURRENT USE OF INSULIN (HCC): Primary | ICD-10-CM

## 2019-09-17 DIAGNOSIS — E78.2 MIXED HYPERLIPIDEMIA: ICD-10-CM

## 2019-09-17 DIAGNOSIS — N63.20 LEFT BREAST LUMP: ICD-10-CM

## 2019-09-17 RX ORDER — SIMVASTATIN 20 MG/1
20 TABLET, FILM COATED ORAL
Qty: 90 TAB | Refills: 3 | Status: SHIPPED | OUTPATIENT
Start: 2019-09-17 | End: 2020-10-30 | Stop reason: SDUPTHER

## 2019-09-17 RX ORDER — HYDROCHLOROTHIAZIDE 25 MG/1
25 TABLET ORAL DAILY
Qty: 90 TAB | Refills: 3 | Status: SHIPPED | OUTPATIENT
Start: 2019-09-17 | End: 2020-10-30 | Stop reason: SDUPTHER

## 2019-09-17 NOTE — PROGRESS NOTES
Identified Patient with two Patient identifiers (Name and ). Two Patient Identifiers confirmed. Reviewed record in preparation for visit and have obtained necessary documentation. Chief Complaint   Patient presents with    New Order     Requesting Order for Diagnostic Bilateral Mammogram and Left Breast Ultrasound - Reschedule Appoinment    Medication Refill     Prescription Refill        Visit Vitals  /84 (BP 1 Location: Left arm, BP Patient Position: Sitting)   Pulse 78   Temp 98.1 °F (36.7 °C) (Oral)   Resp 18   Ht 5' (1.524 m)   Wt 186 lb 6.4 oz (84.6 kg)   SpO2 96%   BMI 36.40 kg/m²       1. Have you been to the ER, urgent care clinic since your last visit? Hospitalized since your last visit? No    2. Have you seen or consulted any other health care providers outside of the [address] [place] [address] since your last visit? Include any pap smears or colon screening.  No

## 2019-09-17 NOTE — PROGRESS NOTES
Subjective:     Ms. Greta Glover is a 69 y/o F with a medical history of HTN , HLD, and anxiety presenting for medication refill and mammogram referral. In [place]  The patient was told she had a lump in her L breast and multiple cysts. She was advised to have another mammogram in 6 months. She has not noticed any palpable lumps and does not have any breast pain . She denies any dimpling, burning, itching, or bleeding from nipple, breast pain, weight loss or fever. The patient does not measure her BP at home . She is compliant with her medication. She denies any HA, visual changes, CP, SOB, abdominal pain, or new onset swelling. HTN: Currently taking HCTZ 25 mg daily. She denies side effects of the medication. No cp/sob. No neurological deficits    HL: Currently taking zocor 20mg daily. Denies side effects of the medication. Past Medical History:   Diagnosis Date    Anxiety     Hypercholesterolemia     Hypertension        Current Outpatient Medications:     hydroCHLOROthiazide (HYDRODIURIL) 25 mg tablet, Take 1 Tab by mouth daily. , Disp: 30 Tab, Rfl: 0    simvastatin (ZOCOR) 20 mg tablet, Take 1 Tab by mouth nightly., Disp: 30 Tab, Rfl: 0    meclizine (ANTIVERT) 25 mg chewable tablet, Take  by mouth three (3) times daily as needed. , Disp: , Rfl:     ALPRAZolam (XANAX) 0.25 mg tablet, Take  by mouth., Disp: , Rfl:      History reviewed. No pertinent surgical history. Pertinent items are noted in HPI. Objective:     Visit Vitals  /84 (BP 1 Location: Left arm, BP Patient Position: Sitting)   Pulse 78   Temp 98.1 °F (36.7 °C) (Oral)   Resp 18   Ht 5' (1.524 m)   Wt 186 lb 6.4 oz (84.6 kg)   SpO2 96%   BMI 36.40 kg/m²     GEN: No apparent distress. Alert and oriented and responds to all questions appropriately.   NECK:  Supple; no masses; thyroid normal           LUNGS: Respirations unlabored; clear to auscultation bilaterally  CARDIOVASCULAR: Regular, rate, and rhythm without murmurs, gallops or rubs   ABDOMEN: Soft; nontender; nondistended; normoactive bowel sounds; no masses or organomegaly  NEUROLOGIC:  No focal neurologic deficits. Strength and sensation grossly intact. Coordination and gait grossly intact. EXT: Well perfused. No edema. SKIN: No obvious rashes. Labs:  Component      Latest Ref Rng & Units 8/9/2019 8/9/2019 8/9/2019 8/9/2019           2:31 PM  2:31 PM  2:31 PM  2:31 PM   Glucose      65 - 99 mg/dL    97   BUN      8 - 27 mg/dL    18   Creatinine      0.57 - 1.00 mg/dL    0.70   GFR est non-AA      >59 mL/min/1.73    87   GFR est AA      >59 mL/min/1.73    101   BUN/Creatinine ratio      12 - 28    26   Sodium      134 - 144 mmol/L    143   Potassium      3.5 - 5.2 mmol/L    4.8   Chloride      96 - 106 mmol/L    102   CO2      20 - 29 mmol/L    24   Calcium      8.7 - 10.3 mg/dL    9.7   Protein, total      6.0 - 8.5 g/dL    7.6   Albumin      3.5 - 4.8 g/dL    4.1   GLOBULIN, TOTAL      1.5 - 4.5 g/dL    3.5   A-G Ratio      1.2 - 2.2    1.2   Bilirubin, total      0.0 - 1.2 mg/dL    <0.2   Alk.  phosphatase      39 - 117 IU/L    97   AST      0 - 40 IU/L    21   ALT (SGPT)      0 - 32 IU/L    15   Cholesterol, total      100 - 199 mg/dL   158    Triglyceride      0 - 149 mg/dL   147    HDL Cholesterol      >39 mg/dL   52    VLDL, calculated      5 - 40 mg/dL   29    LDL, calculated      0 - 99 mg/dL   77    Hemoglobin A1c, (calculated)      4.8 - 5.6 %  6.5 (H)     Estimated average glucose      mg/dL  140     Hep C Virus Ab      0.0 - 0.9 s/co ratio <0.1        Component      Latest Ref Rng & Units 8/9/2019           2:31 PM   WBC      3.4 - 10.8 x10E3/uL 13.1 (H)   RBC      3.77 - 5.28 x10E6/uL 4.19   HGB      11.1 - 15.9 g/dL 12.4   HCT      34.0 - 46.6 % 39.5   MCV      79 - 97 fL 94   MCH      26.6 - 33.0 pg 29.6   MCHC      31.5 - 35.7 g/dL 31.4 (L)   RDW      12.3 - 15.4 % 14.5   PLATELET      859 - 795 x10E3/uL 374   NEUTROPHILS      Not Estab. % 58 Lymphocytes      Not Estab. % 29   MONOCYTES      Not Estab. % 6   EOSINOPHILS      Not Estab. % 6   BASOPHILS      Not Estab. % 1   ABS. NEUTROPHILS      1.4 - 7.0 x10E3/uL 7.7 (H)   Abs Lymphocytes      0.7 - 3.1 x10E3/uL 3.8 (H)   ABS. MONOCYTES      0.1 - 0.9 x10E3/uL 0.8   ABS. EOSINOPHILS      0.0 - 0.4 x10E3/uL 0.7 (H)   ABS. BASOPHILS      0.0 - 0.2 x10E3/uL 0.1   IMMATURE GRANULOCYTES      Not Estab. % 0   ABS. IMM. GRANS.      0.0 - 0.1 x10E3/uL 0.0       Assessment/Plan:   Left breast lump: Will get diagnostic mammogram and US for further evaluation. HTN: Well controlled. Continue HCTZ 25mg daily. HL: Well controlled. Continue zocor 20mg daily. DM: HgbA1c 6.5. Discussed diet and exercise. Will recheck Hgba1c in 3 months. RTC: 3 months.

## 2019-10-15 DIAGNOSIS — R92.8 ABNORMAL MAMMOGRAM: Primary | ICD-10-CM

## 2019-10-15 DIAGNOSIS — N63.20 LEFT BREAST LUMP: ICD-10-CM

## 2020-08-22 ENCOUNTER — TELEPHONE (OUTPATIENT)
Dept: FAMILY MEDICINE CLINIC | Age: 70
End: 2020-08-22

## 2020-08-22 NOTE — TELEPHONE ENCOUNTER
----- Message from Imer Lucero sent at 8/21/2020  9:49 AM EDT -----  Regarding: [place]/telephone  Pts son Chiquis Osborne is requesting an appointment in the office for coughing. She does only speak [place] Sons number is [phone].

## 2020-08-28 ENCOUNTER — VIRTUAL VISIT (OUTPATIENT)
Dept: FAMILY MEDICINE CLINIC | Age: 70
End: 2020-08-28

## 2020-08-28 NOTE — PROGRESS NOTES
Attempted to call patient X3 with no ring and straight to  [id number] #[id number]. VM left to call clinic and reschedule visit.

## 2020-10-30 ENCOUNTER — VIRTUAL VISIT (OUTPATIENT)
Dept: FAMILY MEDICINE CLINIC | Age: 70
End: 2020-10-30
Payer: MEDICARE

## 2020-10-30 DIAGNOSIS — Z00.00 ENCOUNTER FOR HEALTH MAINTENANCE EXAMINATION: ICD-10-CM

## 2020-10-30 DIAGNOSIS — E11.9 CONTROLLED TYPE 2 DIABETES MELLITUS WITHOUT COMPLICATION, WITHOUT LONG-TERM CURRENT USE OF INSULIN (HCC): ICD-10-CM

## 2020-10-30 DIAGNOSIS — E78.2 MIXED HYPERLIPIDEMIA: ICD-10-CM

## 2020-10-30 DIAGNOSIS — I10 ESSENTIAL HYPERTENSION: Primary | ICD-10-CM

## 2020-10-30 PROCEDURE — 3046F HEMOGLOBIN A1C LEVEL >9.0%: CPT | Performed by: STUDENT IN AN ORGANIZED HEALTH CARE EDUCATION/TRAINING PROGRAM

## 2020-10-30 PROCEDURE — 1090F PRES/ABSN URINE INCON ASSESS: CPT | Performed by: STUDENT IN AN ORGANIZED HEALTH CARE EDUCATION/TRAINING PROGRAM

## 2020-10-30 PROCEDURE — G8427 DOCREV CUR MEDS BY ELIG CLIN: HCPCS | Performed by: STUDENT IN AN ORGANIZED HEALTH CARE EDUCATION/TRAINING PROGRAM

## 2020-10-30 PROCEDURE — G8432 DEP SCR NOT DOC, RNG: HCPCS | Performed by: STUDENT IN AN ORGANIZED HEALTH CARE EDUCATION/TRAINING PROGRAM

## 2020-10-30 PROCEDURE — 2022F DILAT RTA XM EVC RTNOPTHY: CPT | Performed by: STUDENT IN AN ORGANIZED HEALTH CARE EDUCATION/TRAINING PROGRAM

## 2020-10-30 PROCEDURE — G8400 PT W/DXA NO RESULTS DOC: HCPCS | Performed by: STUDENT IN AN ORGANIZED HEALTH CARE EDUCATION/TRAINING PROGRAM

## 2020-10-30 PROCEDURE — G9899 SCRN MAM PERF RSLTS DOC: HCPCS | Performed by: STUDENT IN AN ORGANIZED HEALTH CARE EDUCATION/TRAINING PROGRAM

## 2020-10-30 PROCEDURE — 1101F PT FALLS ASSESS-DOCD LE1/YR: CPT | Performed by: STUDENT IN AN ORGANIZED HEALTH CARE EDUCATION/TRAINING PROGRAM

## 2020-10-30 PROCEDURE — 3017F COLORECTAL CA SCREEN DOC REV: CPT | Performed by: STUDENT IN AN ORGANIZED HEALTH CARE EDUCATION/TRAINING PROGRAM

## 2020-10-30 PROCEDURE — 99213 OFFICE O/P EST LOW 20 MIN: CPT | Performed by: STUDENT IN AN ORGANIZED HEALTH CARE EDUCATION/TRAINING PROGRAM

## 2020-10-30 PROCEDURE — G0463 HOSPITAL OUTPT CLINIC VISIT: HCPCS | Performed by: STUDENT IN AN ORGANIZED HEALTH CARE EDUCATION/TRAINING PROGRAM

## 2020-10-30 RX ORDER — HYDROCHLOROTHIAZIDE 25 MG/1
25 TABLET ORAL DAILY
Qty: 90 TAB | Refills: 3 | Status: SHIPPED | OUTPATIENT
Start: 2020-10-30

## 2020-10-30 RX ORDER — SIMVASTATIN 20 MG/1
20 TABLET, FILM COATED ORAL
Qty: 90 TAB | Refills: 3 | Status: SHIPPED | OUTPATIENT
Start: 2020-10-30

## 2020-10-30 NOTE — PROGRESS NOTES
Riley Lezama  65 y.o. female  1950  [address] [facility],[address]  [id number]   [address]:    Telemedicine Progress Note  Lis Villa MD       Encounter Date and Time: October 30, 2020 at 3:14 PM    Consent:  She and/or the health care decision maker is aware that that she may receive a bill for this telephone service, depending on her insurance coverage, and has provided verbal consent to proceed: Yes    Chief Complaint   Patient presents with    Hypertension     History of Present Illness   Riley Lezama is a 65 y.o. female was evaluated by synchronous (real-time) audio-video technology from home, through the [facility] Patient Portal.    HTN:  - No BP log  - Unsure if BP controlled  - Needs refill for HCTZ 25 mg daily  - Denies headache, visual disturbance, CP, SOB, abdominal/back pain, n/v/d/c. HLD:  - Not on diet  - Denies weight change    T2DM:  - Did not know she was diabetic  - Denied focal deficits, changes in sensation, pain in extremities, visual change, CP, SOB, n/v, polyuria/polydipsia    Health Maintenance:  - Due for medicare wellness    Specialists:  none    COVID Questions:   Experiencing any of the following symptoms: fever, chills, cough, SOB, diarrhea, URI symptoms. NO   Any Sick contacts with fever, cough, diarrhea, SOB, URI symptoms. NO  Traveled out of state or out of country. NO  Hospitalization/ED visit in last 2 weeks: NO  Lives with family. Has been staying at home. Review of Systems   Review of Systems   Constitutional: Negative for chills and fever. HENT: Negative for congestion, sinus pain and sore throat. Eyes: Negative for blurred vision. Respiratory: Negative for cough and shortness of breath. Cardiovascular: Negative for chest pain and palpitations. Gastrointestinal: Negative for abdominal pain, constipation, diarrhea, nausea and vomiting. Genitourinary: Negative for dysuria and frequency.    Skin: Negative for rash. Neurological: Negative for dizziness, sensory change and headaches. Endo/Heme/Allergies: Negative for polydipsia. Vitals/Objective:     General: alert, cooperative, no distress   Mental  status: mental status: alert, oriented to person, place, and time, normal mood, behavior, speech, dress, motor activity, and thought processes   Resp: resp: normal effort and no respiratory distress   Neuro: neuro: no gross deficits   Skin: skin: no discoloration or lesions of concern on visible areas   Due to this being a TeleHealth evaluation, many elements of the physical examination are unable to be assessed. Assessment and Plan:   Time-based coding, delete if not needed: I spent at least 25 minutes with this established patient, and >50% of the time was spent counseling and/or coordinating care regarding Chronic Conditions    1. Essential hypertension: Asymptomatic  - Refill given  - Recommended keeping daily BP log, will check measurments in 2 weeks  - Counseled on DASH diet and exercise   - hydroCHLOROthiazide (HYDRODIURIL) 25 mg tablet; Take 1 Tab by mouth daily. Dispense: 90 Tab; Refill: 3  - METABOLIC PANEL, BASIC; Future    2. Mixed hyperlipidemia  - simvastatin (ZOCOR) 20 mg tablet; Take 1 Tab by mouth nightly. Dispense: 90 Tab; Refill: 3    3. Controlled type 2 diabetes mellitus without complication, without long-term current use of insulin ([facility] Unaware of diagnosis. - (2019) A1c 6.5  - Counseled on diet  - HEMOGLOBIN A1C WITH EAG; Future  - Micralb/Cr ratio    4. Encounter for health maintenance examination  - CBC W/O DIFF; Future    *Needs Medicare Wellness visit as well, schedule after next visit*        Time spent in direct conversation with the patient to include medical condition(s) discussed, assessment and treatment plan: >25 min       We discussed the expected course, resolution and complications of the diagnosis(es) in detail.   Medication risks, benefits, costs, interactions, and alternatives were discussed as indicated. I advised her to contact the office if her condition worsens, changes or fails to improve as anticipated. She expressed understanding with the diagnosis(es) and plan. Patient understands that this encounter was a temporary measure, and the importance of further follow up and examination was emphasized. Patient verbalized understanding. Patient informed to follow up: Follow-up and Dispositions  ·   Return in about 2 weeks (around 11/13/2020) for HTN and lab result. Electronically Signed: Lis Villa MD    Providers location when delivering service: Clinic    Pursuant to the emergency declaration under the [facility], [facility] waiver authority and the [employer] Act, this Virtual  Visit was conducted, with patient's consent, to reduce the patient's risk of exposure to COVID-19 and provide continuity of care for an established patient. Services were provided through a video synchronous discussion virtually to substitute for in-person clinic visit. History   Patients past medical, surgical and family histories were reviewed and updated. Past Medical History:   Diagnosis Date    Anxiety     Hypercholesterolemia     Hypertension      No past surgical history on file. No family history on file.   Social History     Socioeconomic History    Marital status: SINGLE     Spouse name: Not on file    Number of children: Not on file    Years of education: Not on file    Highest education level: Not on file   Occupational History    Not on file   Social Needs    Financial resource strain: Not on file    Food insecurity     Worry: Not on file     Inability: Not on file    Transportation needs     Medical: Not on file     Non-medical: Not on file   Tobacco Use    Smoking status: Never Smoker    Smokeless tobacco: Never Used   Substance and Sexual Activity    Alcohol use: Never     Frequency: Never    Drug use: Not on file    Sexual activity: Not Currently   Lifestyle    Physical activity     Days per week: Not on file     Minutes per session: Not on file    Stress: Not on file   Relationships    Social connections     Talks on phone: Not on file     Gets together: Not on file     Attends Gnosticism service: Not on file     Active member of club or organization: Not on file     Attends meetings of clubs or organizations: Not on file     Relationship status: Not on file    Intimate partner violence     Fear of current or ex partner: Not on file     Emotionally abused: Not on file     Physically abused: Not on file     Forced sexual activity: Not on file   Other Topics Concern    Not on file   Social History Narrative    Not on file     There is no problem list on file for this patient. Current Medications/Allergies   Medications and Allergies reviewed:    Current Outpatient Medications   Medication Sig Dispense Refill    hydroCHLOROthiazide (HYDRODIURIL) 25 mg tablet Take 1 Tab by mouth daily. 90 Tab 3    simvastatin (ZOCOR) 20 mg tablet Take 1 Tab by mouth nightly. 90 Tab 3    meclizine (ANTIVERT) 25 mg chewable tablet Take  by mouth three (3) times daily as needed.  ALPRAZolam (XANAX) 0.25 mg tablet Take  by mouth.        Allergies   Allergen Reactions    Penicillins Itching

## 2020-11-06 ENCOUNTER — LAB ONLY (OUTPATIENT)
Dept: FAMILY MEDICINE CLINIC | Age: 70
End: 2020-11-06

## 2020-11-06 DIAGNOSIS — E11.9 CONTROLLED TYPE 2 DIABETES MELLITUS WITHOUT COMPLICATION, WITHOUT LONG-TERM CURRENT USE OF INSULIN (HCC): ICD-10-CM

## 2020-11-06 DIAGNOSIS — I10 ESSENTIAL HYPERTENSION: ICD-10-CM

## 2020-11-06 DIAGNOSIS — Z00.00 ENCOUNTER FOR HEALTH MAINTENANCE EXAMINATION: ICD-10-CM

## 2020-11-06 LAB
ANION GAP SERPL CALC-SCNC: 9 MMOL/L (ref 5–15)
BUN SERPL-MCNC: 14 MG/DL (ref 6–20)
BUN/CREAT SERPL: 18 (ref 12–20)
CALCIUM SERPL-MCNC: 9.3 MG/DL (ref 8.5–10.1)
CHLORIDE SERPL-SCNC: 102 MMOL/L (ref 97–108)
CO2 SERPL-SCNC: 29 MMOL/L (ref 21–32)
CREAT SERPL-MCNC: 0.76 MG/DL (ref 0.55–1.02)
ERYTHROCYTE [DISTWIDTH] IN BLOOD BY AUTOMATED COUNT: 14.2 % (ref 11.5–14.5)
EST. AVERAGE GLUCOSE BLD GHB EST-MCNC: 131 MG/DL
GLUCOSE SERPL-MCNC: 96 MG/DL (ref 65–100)
HBA1C MFR BLD: 6.2 % (ref 4–5.6)
HCT VFR BLD AUTO: 42.1 % (ref 35–47)
HGB BLD-MCNC: 13 G/DL (ref 11.5–16)
MCH RBC QN AUTO: 29.5 PG (ref 26–34)
MCHC RBC AUTO-ENTMCNC: 30.9 G/DL (ref 30–36.5)
MCV RBC AUTO: 95.5 FL (ref 80–99)
NRBC # BLD: 0 K/UL (ref 0–0.01)
NRBC BLD-RTO: 0 PER 100 WBC
PLATELET # BLD AUTO: 384 K/UL (ref 150–400)
PMV BLD AUTO: 12 FL (ref 8.9–12.9)
POTASSIUM SERPL-SCNC: 3.8 MMOL/L (ref 3.5–5.1)
RBC # BLD AUTO: 4.41 M/UL (ref 3.8–5.2)
SODIUM SERPL-SCNC: 140 MMOL/L (ref 136–145)
WBC # BLD AUTO: 9.2 K/UL (ref 3.6–11)

## 2020-11-06 NOTE — PROGRESS NOTES
[address] Medicine Residency Attending Addendum:  Dr. Mercedez Bridges MD,  the patient and I were not physically present during this encounter. The resident and I are concurrently monitoring the patient care through appropriate telecommunication technology. I discussed the findings, assessment and plan with the resident and agree with the resident's findings and plan as documented in the resident's note.       Al Fuentes MD

## 2020-11-07 LAB
CREAT UR-MCNC: 242 MG/DL
MICROALBUMIN UR-MCNC: 1.22 MG/DL
MICROALBUMIN/CREAT UR-RTO: 5 MG/G (ref 0–30)

## 2020-11-07 NOTE — PROGRESS NOTES
A1c improved to 6.2  Hence diet controlled T2DM    BMP showing no electrolyte derangement and normal kidney function    CBC wnl

## 2020-11-09 LAB
COMMENT, HOLDF: NORMAL
SAMPLES BEING HELD,HOLD: NORMAL

## 2020-11-16 ENCOUNTER — TELEPHONE (OUTPATIENT)
Dept: FAMILY MEDICINE CLINIC | Age: 70
End: 2020-11-16

## 2020-12-07 ENCOUNTER — TELEPHONE (OUTPATIENT)
Dept: FAMILY MEDICINE CLINIC | Age: 70
End: 2020-12-07

## 2020-12-07 NOTE — TELEPHONE ENCOUNTER
----- Message from Tatiana Baker sent at 11/16/2020 10:01 AM EST -----  Regarding: Dr. Sharmin Meneses  Appointment not available    Caller's first and last name and relationship to patient (if not the patient):Marco A/ son       Best contact number: [phone]      Preferred date and time:any Friday any time of the day       Scheduled appointment date and timenone       Reason for appointment:  diabtes, htn follow up, Pt requires a Qatari interpretor       Details to clarify the request:      Tatiana Baker

## 2022-10-17 ENCOUNTER — HOSPITAL ENCOUNTER (EMERGENCY)
Age: 72
Discharge: HOME | End: 2022-10-17
Attending: EMERGENCY MEDICINE
Payer: COMMERCIAL

## 2022-10-17 VITALS
TEMPERATURE: 98.1 F | BODY MASS INDEX: 30.37 KG/M2 | DIASTOLIC BLOOD PRESSURE: 68 MMHG | RESPIRATION RATE: 20 BRPM | HEIGHT: 64 IN | OXYGEN SATURATION: 99 % | HEART RATE: 68 BPM | WEIGHT: 177.91 LBS | SYSTOLIC BLOOD PRESSURE: 132 MMHG

## 2022-10-17 DIAGNOSIS — K21.9 GASTROESOPHAGEAL REFLUX DISEASE WITHOUT ESOPHAGITIS: Primary | ICD-10-CM

## 2022-10-17 LAB
ALBUMIN SERPL-MCNC: 4 G/DL (ref 3.5–5.2)
ALBUMIN/GLOB SERPL: 1.1 {RATIO} (ref 1.1–2.2)
ALP SERPL-CCNC: 98 U/L (ref 35–104)
ALT SERPL-CCNC: 13 U/L (ref 10–35)
ANION GAP SERPL CALC-SCNC: 10 MMOL/L (ref 5–15)
AST SERPL-CCNC: 20 U/L (ref 10–35)
BASOPHILS # BLD: 0.1 K/UL (ref 0–0.1)
BASOPHILS NFR BLD: 1 % (ref 0–1)
BILIRUB SERPL-MCNC: 0.3 MG/DL (ref 0.2–1)
BUN SERPL-MCNC: 10 MG/DL (ref 8–23)
BUN/CREAT SERPL: 17 (ref 12–20)
CALCIUM SERPL-MCNC: 8.9 MG/DL (ref 8.8–10.2)
CHLORIDE SERPL-SCNC: 106 MMOL/L (ref 98–107)
CO2 SERPL-SCNC: 28 MMOL/L (ref 22–29)
CREAT SERPL-MCNC: 0.58 MG/DL (ref 0.5–0.9)
DIFFERENTIAL METHOD BLD: ABNORMAL
EOSINOPHIL # BLD: 0.2 K/UL (ref 0–0.4)
EOSINOPHIL NFR BLD: 2 % (ref 0–7)
ERYTHROCYTE [DISTWIDTH] IN BLOOD BY AUTOMATED COUNT: 14.8 % (ref 11.5–14.5)
GLOBULIN SER CALC-MCNC: 3.8 G/DL (ref 2–4)
GLUCOSE SERPL-MCNC: 111 MG/DL (ref 65–100)
HCT VFR BLD AUTO: 39.1 % (ref 35–47)
HGB BLD-MCNC: 12.9 G/DL (ref 11.5–16)
IMM GRANULOCYTES # BLD AUTO: 0 K/UL (ref 0–0.04)
IMM GRANULOCYTES NFR BLD AUTO: 0 % (ref 0–0.5)
LIPASE SERPL-CCNC: 27 U/L (ref 13–60)
LYMPHOCYTES # BLD: 2.6 K/UL (ref 0.8–3.5)
LYMPHOCYTES NFR BLD: 30 % (ref 12–49)
MCH RBC QN AUTO: 30.3 PG (ref 26–34)
MCHC RBC AUTO-ENTMCNC: 33 G/DL (ref 30–36.5)
MCV RBC AUTO: 91.8 FL (ref 80–99)
MONOCYTES # BLD: 0.6 K/UL (ref 0–1)
MONOCYTES NFR BLD: 7 % (ref 5–13)
NEUTS SEG # BLD: 5.2 K/UL (ref 1.8–8)
NEUTS SEG NFR BLD: 60 % (ref 32–75)
NRBC # BLD: 0 K/UL (ref 0–0.01)
NRBC BLD-RTO: 0 PER 100 WBC
PLATELET # BLD AUTO: 324 K/UL (ref 150–400)
PMV BLD AUTO: 11.4 FL (ref 8.9–12.9)
POTASSIUM SERPL-SCNC: 3.8 MMOL/L (ref 3.5–5.1)
PROT SERPL-MCNC: 7.8 G/DL (ref 6.4–8.3)
RBC # BLD AUTO: 4.26 M/UL (ref 3.8–5.2)
SODIUM SERPL-SCNC: 144 MMOL/L (ref 136–145)
WBC # BLD AUTO: 8.7 K/UL (ref 3.6–11)

## 2022-10-17 PROCEDURE — 83690 ASSAY OF LIPASE: CPT

## 2022-10-17 PROCEDURE — 36415 COLL VENOUS BLD VENIPUNCTURE: CPT

## 2022-10-17 PROCEDURE — 99283 EMERGENCY DEPT VISIT LOW MDM: CPT

## 2022-10-17 PROCEDURE — 85025 COMPLETE CBC W/AUTO DIFF WBC: CPT

## 2022-10-17 PROCEDURE — 74011250637 HC RX REV CODE- 250/637: Performed by: EMERGENCY MEDICINE

## 2022-10-17 PROCEDURE — 80053 COMPREHEN METABOLIC PANEL: CPT

## 2022-10-17 RX ORDER — FAMOTIDINE 20 MG/1
20 TABLET, FILM COATED ORAL 2 TIMES DAILY
Qty: 20 TABLET | Refills: 0 | Status: SHIPPED | OUTPATIENT
Start: 2022-10-17 | End: 2022-10-27

## 2022-10-17 RX ORDER — FAMOTIDINE 20 MG/1
20 TABLET, FILM COATED ORAL
Status: COMPLETED | OUTPATIENT
Start: 2022-10-17 | End: 2022-10-17

## 2022-10-17 RX ORDER — IRBESARTAN 150 MG/1
150 TABLET ORAL
COMMUNITY

## 2022-10-17 RX ORDER — SUCRALFATE 1 G/1
1 TABLET ORAL
Status: COMPLETED | OUTPATIENT
Start: 2022-10-17 | End: 2022-10-17

## 2022-10-17 RX ADMIN — SUCRALFATE 1 G: 1 TABLET ORAL at 08:50

## 2022-10-17 RX ADMIN — FAMOTIDINE 20 MG: 20 TABLET, FILM COATED ORAL at 08:50

## 2022-10-17 NOTE — ED PROVIDER NOTES
59-year-old female presents from home via private vehicle with a complaint of stomach pain and acid reflux. Patient was interviewed using [email or web address] . Patient's been having symptoms off and on for the past week. States having pain in her epigastrium with no radiation. She is also complaining of bilateral shoulder and bilateral knee pain. She is not taking any medications for the symptoms. She has had some nausea but no vomiting. Denies any cough or shortness of breath. She has had similar symptoms to this in the past and was treated in [place] after which she got better but the symptoms have returned. She reports having vaginal surgery 40 years ago but no other abdominal surgery. She still has a gallbladder and pancreas. No clear exacerbating or alleviating factors. Past Medical History:   Diagnosis Date    Anxiety     Hypercholesterolemia     Hypertension        History reviewed. No pertinent surgical history. History reviewed. No pertinent family history. Social History     Socioeconomic History    Marital status: SINGLE     Spouse name: Not on file    Number of children: Not on file    Years of education: Not on file    Highest education level: Not on file   Occupational History    Not on file   Tobacco Use    Smoking status: Never    Smokeless tobacco: Never   Substance and Sexual Activity    Alcohol use: Never    Drug use: Not on file    Sexual activity: Not Currently   Other Topics Concern    Not on file   Social History Narrative    Not on file     Social Determinants of Health     Financial Resource Strain: Not on file   Food Insecurity: Not on file   Transportation Needs: Not on file   Physical Activity: Not on file   Stress: Not on file   Social Connections: Not on file   Intimate Partner Violence: Not on file   Housing Stability: Not on file         ALLERGIES: Penicillins    Review of Systems   Constitutional:  Negative for fever. HENT:  Negative for facial swelling. Eyes:  Negative for visual disturbance. Respiratory:  Negative for chest tightness. Cardiovascular:  Negative for chest pain. Gastrointestinal:  Positive for abdominal pain. Genitourinary:  Negative for difficulty urinating and dysuria. Musculoskeletal:  Negative for arthralgias. Skin:  Negative for rash. Neurological:  Negative for headaches. Hematological:  Negative for adenopathy. Psychiatric/Behavioral:  Negative for suicidal ideas. Vitals:    10/17/22 0825   BP: (!) 156/81   Pulse: 68   Resp: 20   Temp: 98.1 °F (36.7 °C)   SpO2: 98%   Weight: 80.7 kg (177 lb 14.6 oz)   Height: 5' 4\" (1.626 m)            Physical Exam  Vitals and nursing note reviewed. Constitutional:       General: She is not in acute distress. Appearance: She is well-developed. HENT:      Head: Normocephalic and atraumatic. Eyes:      General: No scleral icterus. Conjunctiva/sclera: Conjunctivae normal.      Pupils: Pupils are equal, round, and reactive to light. Cardiovascular:      Rate and Rhythm: Normal rate. Heart sounds: No murmur heard. Pulmonary:      Effort: Pulmonary effort is normal. No respiratory distress. Abdominal:      General: There is no distension. Musculoskeletal:         General: Normal range of motion. Cervical back: Normal range of motion and neck supple. Skin:     General: Skin is warm and dry. Findings: No rash. Neurological:      Mental Status: She is alert and oriented to person, place, and time. MDM  Number of Diagnoses or Management Options  Gastroesophageal reflux disease without esophagitis  Diagnosis management comments: Assessment: Patient here with symptoms of acid reflux off and on for the past several weeks. Vital signs stable with a slightly elevated blood pressure. Exam is reassuring with no focal tenderness. She had blood work done which was reassuring. She had normal LFTs, normal lipase, normal white count.   Patient is currently resting comfortably without any complaints. I think she can be discharged home to treat symptomatically with Pepcid. Patient vies to follow-up with her primary care doctor for further evaluation if her symptoms are not improving.   She has any new or other concerning symptoms she can return to the ED for repeat evaluation       Amount and/or Complexity of Data Reviewed  Clinical lab tests: reviewed           Procedures

## 2022-10-17 NOTE — ED TRIAGE NOTES
Pt arrives to ED w/ cc of stomach pain and shoulder pain and knee pain. Pt reports she has a hx of acid reflux. Pt reports she went to [place] \"I think I got a bacteria there, was given an antibiotic. \"        ID #[id number]

## 2022-10-31 ENCOUNTER — TRANSCRIBE ORDER (OUTPATIENT)
Dept: SCHEDULING | Age: 72
End: 2022-10-31

## 2022-10-31 ENCOUNTER — TRANSCRIBE ORDER (OUTPATIENT)
Dept: GENERAL RADIOLOGY | Age: 72
End: 2022-10-31

## 2022-10-31 ENCOUNTER — HOSPITAL ENCOUNTER (OUTPATIENT)
Dept: MAMMOGRAPHY | Age: 72
Discharge: HOME | End: 2022-10-31
Payer: COMMERCIAL

## 2022-10-31 DIAGNOSIS — Z12.31 BREAST CANCER SCREENING BY MAMMOGRAM: ICD-10-CM

## 2022-10-31 DIAGNOSIS — Z12.31 BREAST CANCER SCREENING BY MAMMOGRAM: Primary | ICD-10-CM

## 2022-10-31 DIAGNOSIS — R92.8 ABNORMAL MAMMOGRAM: Primary | ICD-10-CM

## 2022-10-31 PROCEDURE — 77063 BREAST TOMOSYNTHESIS BI: CPT

## 2022-10-31 PROCEDURE — 77067 SCR MAMMO BI INCL CAD: CPT

## 2022-10-31 NOTE — PROGRESS NOTES
Patient was Latvian speaking so I spoke with her son and explained that she needed extra imaging and possible ultrasound. I gave him the scheduling number and he will call and set up appt for her.   [facility] 10/31/22

## 2022-11-13 NOTE — PROGRESS NOTES
Subjective   CC:  Tyler Mancilla is a 74 y.o. female who presents for med refill. URI:   - SXS: runny nose, two days of subjective fevers - have since resolved, congestion  - Meds: Tukol w improvement of sxs  - Sxs present x 8 days, improving  - Sick contacts include one of the people she lives with - he was not tested   - No headaches, myalgias, arthralgias, CP, palp, dypsnea  - Overall sxs improving    HTN:   - Meds: HCTZ 25mg daily, Irbesartan 150mg   - BP log: has not been taking  - Diet recall: low salt, low fat diet; chicken, coffee with bread, eggs, beans, cheese, water, no sodas  - Exercise: not exercising daily, cleans, cooks, mops/sweeps    No Has, chest pain, palp, dyspnea, abd pain, n/v, c/d. Complete ROS performed and pertinent positives/negatives are documented in HPI. Past Medical History  Past Medical History:   Diagnosis Date    Anxiety     Hypercholesterolemia     Hypertension     Menopause        Current Medications  Current Outpatient Medications   Medication Sig Dispense Refill    hydroCHLOROthiazide (HYDRODIURIL) 25 mg tablet Take 1 Tablet by mouth daily. 90 Tablet 1    simvastatin (ZOCOR) 20 mg tablet Take 1 Tablet by mouth nightly. 90 Tablet 3    irbesartan (AVAPRO) 150 mg tablet Take 1 Tablet by mouth nightly.  90 Tablet 1       Allergies  Allergies   Allergen Reactions    Penicillins Itching       Social History   Social History     Socioeconomic History    Marital status:      Spouse name: Not on file    Number of children: Not on file    Years of education: Not on file    Highest education level: Not on file   Occupational History    Not on file   Tobacco Use    Smoking status: Never    Smokeless tobacco: Never   Substance and Sexual Activity    Alcohol use: Never    Drug use: Not on file    Sexual activity: Not Currently   Other Topics Concern    Not on file   Social History Narrative    Not on file     Social Determinants of Health     Financial Resource Strain: Not on file   Food Insecurity: Not on file   Transportation Needs: Not on file   Physical Activity: Not on file   Stress: Not on file   Social Connections: Not on file   Intimate Partner Violence: Not on file   Housing Stability: Not on file       Family History  History reviewed. No pertinent family history. Objective   Vital Signs  Visit Vitals  /77 (BP 1 Location: Left arm, BP Patient Position: Sitting, BP Cuff Size: Large adult)   Pulse 75   Resp 16   SpO2 98%       Physical Examination  Physical Exam  Constitutional:       Appearance: Normal appearance. HENT:      Head: Normocephalic and atraumatic. Nose: Nose normal. No congestion or rhinorrhea. Mouth/Throat:      Mouth: Mucous membranes are moist.      Pharynx: Oropharynx is clear. No oropharyngeal exudate or posterior oropharyngeal erythema. Eyes:      Conjunctiva/sclera: Conjunctivae normal.   Cardiovascular:      Rate and Rhythm: Normal rate and regular rhythm. Pulmonary:      Effort: Pulmonary effort is normal.      Breath sounds: Normal breath sounds. Abdominal:      General: Abdomen is flat. Palpations: Abdomen is soft. Musculoskeletal:         General: Normal range of motion. Cervical back: Normal range of motion and neck supple. Lymphadenopathy:      Cervical: No cervical adenopathy. Skin:     General: Skin is warm. Neurological:      General: No focal deficit present. Mental Status: She is alert and oriented to person, place, and time. Assessment and Plan   Thi Church is a 74 y. o. who is here for URI and HTN. 1. Acute URI  Sxs improving and exam reassuring  will test for flu and COVID  recommend symptomatic management with Guaifenesin and Tylenol/Motrin PRN  - NOVEL CORONAVIRUS (COVID-19)  - AMB POC RAPID INFLUENZA TEST    2. HTN (hypertension), benign  BP well controlled on current regimen  Refills provided x 6m  - CBC W/O DIFF; Future  - METABOLIC PANEL, COMPREHENSIVE;  Future  - irbesartan (AVAPRO) 150 mg tablet; Take 1 Tablet by mouth nightly. Dispense: 90 Tablet; Refill: 1  - METABOLIC PANEL, COMPREHENSIVE  - CBC W/O DIFF    3. Prediabetes  Will check labs today  last A1c 6.2%  - CBC W/O DIFF; Future  - METABOLIC PANEL, COMPREHENSIVE; Future  - HEMOGLOBIN A1C WITH EAG; Future  - HEMOGLOBIN A1C WITH EAG  - METABOLIC PANEL, COMPREHENSIVE  - CBC W/O DIFF    4. Mixed hyperlipidemia  Labs today  - CBC W/O DIFF; Future  - LIPID PANEL; Future  - simvastatin (ZOCOR) 20 mg tablet; Take 1 Tablet by mouth nightly. Dispense: 90 Tablet; Refill: 3  - CBC W/O DIFF  - LIPID PANEL    5. Chronic GERD  - CBC W/O DIFF; Future  - CBC W/O DIFF    6. LENARD (generalized anxiety disorder)  - CBC W/O DIFF; Future  - CBC W/O DIFF    7. Essential hypertension  - hydroCHLOROthiazide (HYDRODIURIL) 25 mg tablet; Take 1 Tablet by mouth daily. Dispense: 90 Tablet; Refill: 1     C sports medicine for right arm pain - c/f frozen shoulder    Patient is counseled to return to the office if symptoms do not improve as expected. Urgent consultation with the nearest Emergency Department is strongly recommended if condition worsens. Patient is counseled to follow up as recommended and to inform the office if any changes in treatment are recommended.       I discussed this patient with Dr. Laura Gonzalez (Attending Physician)       Signed By:  Jolie Paul MD  Family Medicine Resident

## 2022-11-14 ENCOUNTER — OFFICE VISIT (OUTPATIENT)
Dept: FAMILY MEDICINE CLINIC | Age: 72
End: 2022-11-14
Payer: COMMERCIAL

## 2022-11-14 VITALS
RESPIRATION RATE: 16 BRPM | DIASTOLIC BLOOD PRESSURE: 77 MMHG | HEART RATE: 75 BPM | OXYGEN SATURATION: 98 % | SYSTOLIC BLOOD PRESSURE: 129 MMHG

## 2022-11-14 DIAGNOSIS — I10 HTN (HYPERTENSION), BENIGN: ICD-10-CM

## 2022-11-14 DIAGNOSIS — R73.03 PREDIABETES: ICD-10-CM

## 2022-11-14 DIAGNOSIS — J06.9 ACUTE URI: Primary | ICD-10-CM

## 2022-11-14 DIAGNOSIS — I10 ESSENTIAL HYPERTENSION: ICD-10-CM

## 2022-11-14 DIAGNOSIS — K21.9 CHRONIC GERD: ICD-10-CM

## 2022-11-14 DIAGNOSIS — E78.2 MIXED HYPERLIPIDEMIA: ICD-10-CM

## 2022-11-14 DIAGNOSIS — F41.1 GAD (GENERALIZED ANXIETY DISORDER): ICD-10-CM

## 2022-11-14 LAB
ALBUMIN SERPL-MCNC: 3.6 G/DL (ref 3.5–5)
ALBUMIN/GLOB SERPL: 0.9 {RATIO} (ref 1.1–2.2)
ALP SERPL-CCNC: 100 U/L (ref 45–117)
ALT SERPL-CCNC: 24 U/L (ref 12–78)
ANION GAP SERPL CALC-SCNC: 4 MMOL/L (ref 5–15)
AST SERPL-CCNC: 24 U/L (ref 15–37)
BILIRUB SERPL-MCNC: 0.2 MG/DL (ref 0.2–1)
BUN SERPL-MCNC: 10 MG/DL (ref 6–20)
BUN/CREAT SERPL: 16 (ref 12–20)
CALCIUM SERPL-MCNC: 8.7 MG/DL (ref 8.5–10.1)
CHLORIDE SERPL-SCNC: 109 MMOL/L (ref 97–108)
CHOLEST SERPL-MCNC: 195 MG/DL
CO2 SERPL-SCNC: 29 MMOL/L (ref 21–32)
CREAT SERPL-MCNC: 0.62 MG/DL (ref 0.55–1.02)
ERYTHROCYTE [DISTWIDTH] IN BLOOD BY AUTOMATED COUNT: 14.1 % (ref 11.5–14.5)
EST. AVERAGE GLUCOSE BLD GHB EST-MCNC: 131 MG/DL
FLUAV+FLUBV AG NOSE QL IA.RAPID: NEGATIVE
FLUAV+FLUBV AG NOSE QL IA.RAPID: NEGATIVE
GLOBULIN SER CALC-MCNC: 3.9 G/DL (ref 2–4)
GLUCOSE SERPL-MCNC: 110 MG/DL (ref 65–100)
HBA1C MFR BLD: 6.2 % (ref 4–5.6)
HCT VFR BLD AUTO: 38.4 % (ref 35–47)
HDLC SERPL-MCNC: 42 MG/DL
HDLC SERPL: 4.6 {RATIO} (ref 0–5)
HGB BLD-MCNC: 12.2 G/DL (ref 11.5–16)
LDLC SERPL CALC-MCNC: 127.6 MG/DL (ref 0–100)
MCH RBC QN AUTO: 30 PG (ref 26–34)
MCHC RBC AUTO-ENTMCNC: 31.8 G/DL (ref 30–36.5)
MCV RBC AUTO: 94.6 FL (ref 80–99)
NRBC # BLD: 0 K/UL (ref 0–0.01)
NRBC BLD-RTO: 0 PER 100 WBC
PLATELET # BLD AUTO: 286 K/UL (ref 150–400)
PMV BLD AUTO: 12.6 FL (ref 8.9–12.9)
POTASSIUM SERPL-SCNC: 4.6 MMOL/L (ref 3.5–5.1)
PROT SERPL-MCNC: 7.5 G/DL (ref 6.4–8.2)
RBC # BLD AUTO: 4.06 M/UL (ref 3.8–5.2)
SODIUM SERPL-SCNC: 142 MMOL/L (ref 136–145)
TRIGL SERPL-MCNC: 127 MG/DL (ref ?–150)
VALID INTERNAL CONTROL?: YES
VLDLC SERPL CALC-MCNC: 25.4 MG/DL
WBC # BLD AUTO: 6.3 K/UL (ref 3.6–11)

## 2022-11-14 PROCEDURE — G8417 CALC BMI ABV UP PARAM F/U: HCPCS | Performed by: STUDENT IN AN ORGANIZED HEALTH CARE EDUCATION/TRAINING PROGRAM

## 2022-11-14 PROCEDURE — 87804 INFLUENZA ASSAY W/OPTIC: CPT | Performed by: STUDENT IN AN ORGANIZED HEALTH CARE EDUCATION/TRAINING PROGRAM

## 2022-11-14 PROCEDURE — 99214 OFFICE O/P EST MOD 30 MIN: CPT | Performed by: STUDENT IN AN ORGANIZED HEALTH CARE EDUCATION/TRAINING PROGRAM

## 2022-11-14 PROCEDURE — G8427 DOCREV CUR MEDS BY ELIG CLIN: HCPCS | Performed by: STUDENT IN AN ORGANIZED HEALTH CARE EDUCATION/TRAINING PROGRAM

## 2022-11-14 PROCEDURE — G8400 PT W/DXA NO RESULTS DOC: HCPCS | Performed by: STUDENT IN AN ORGANIZED HEALTH CARE EDUCATION/TRAINING PROGRAM

## 2022-11-14 PROCEDURE — 3074F SYST BP LT 130 MM HG: CPT | Performed by: STUDENT IN AN ORGANIZED HEALTH CARE EDUCATION/TRAINING PROGRAM

## 2022-11-14 PROCEDURE — 1101F PT FALLS ASSESS-DOCD LE1/YR: CPT | Performed by: STUDENT IN AN ORGANIZED HEALTH CARE EDUCATION/TRAINING PROGRAM

## 2022-11-14 PROCEDURE — G0463 HOSPITAL OUTPT CLINIC VISIT: HCPCS | Performed by: STUDENT IN AN ORGANIZED HEALTH CARE EDUCATION/TRAINING PROGRAM

## 2022-11-14 PROCEDURE — 1090F PRES/ABSN URINE INCON ASSESS: CPT | Performed by: STUDENT IN AN ORGANIZED HEALTH CARE EDUCATION/TRAINING PROGRAM

## 2022-11-14 PROCEDURE — 1123F ACP DISCUSS/DSCN MKR DOCD: CPT | Performed by: STUDENT IN AN ORGANIZED HEALTH CARE EDUCATION/TRAINING PROGRAM

## 2022-11-14 PROCEDURE — G8536 NO DOC ELDER MAL SCRN: HCPCS | Performed by: STUDENT IN AN ORGANIZED HEALTH CARE EDUCATION/TRAINING PROGRAM

## 2022-11-14 PROCEDURE — 3017F COLORECTAL CA SCREEN DOC REV: CPT | Performed by: STUDENT IN AN ORGANIZED HEALTH CARE EDUCATION/TRAINING PROGRAM

## 2022-11-14 PROCEDURE — G9899 SCRN MAM PERF RSLTS DOC: HCPCS | Performed by: STUDENT IN AN ORGANIZED HEALTH CARE EDUCATION/TRAINING PROGRAM

## 2022-11-14 PROCEDURE — G8432 DEP SCR NOT DOC, RNG: HCPCS | Performed by: STUDENT IN AN ORGANIZED HEALTH CARE EDUCATION/TRAINING PROGRAM

## 2022-11-14 PROCEDURE — 3078F DIAST BP <80 MM HG: CPT | Performed by: STUDENT IN AN ORGANIZED HEALTH CARE EDUCATION/TRAINING PROGRAM

## 2022-11-14 RX ORDER — IRBESARTAN 150 MG/1
150 TABLET ORAL
Qty: 90 TABLET | Refills: 1 | Status: SHIPPED | OUTPATIENT
Start: 2022-11-14 | End: 2022-11-17 | Stop reason: SDUPTHER

## 2022-11-14 RX ORDER — HYDROCHLOROTHIAZIDE 25 MG/1
25 TABLET ORAL DAILY
Qty: 90 TABLET | Refills: 1 | Status: SHIPPED | OUTPATIENT
Start: 2022-11-14 | End: 2022-11-17 | Stop reason: SDUPTHER

## 2022-11-14 RX ORDER — SIMVASTATIN 20 MG/1
20 TABLET, FILM COATED ORAL
Qty: 90 TABLET | Refills: 3 | Status: SHIPPED | OUTPATIENT
Start: 2022-11-14 | End: 2022-11-17

## 2022-11-14 NOTE — PROGRESS NOTES
Chief Complaint   Patient presents with    Medication Refill     1. Have you been to the ER, urgent care clinic since your last visit? Hospitalized since your last visit? No    2. Have you seen or consulted any other health care providers outside of the [address] [place] [address] since your last visit? Include any pap smears or colon screening.  No Cardiovascular Disease Progress Note    Overnight events: No acute events overnight. Mr. Raymundo denies chest pain or SOB.     Otherwise review of systems negative    Objective Findings:  T(C): 36.3 (10-29-20 @ 05:36), Max: 36.7 (10-28-20 @ 20:20)  HR: 64 (10-29-20 @ 05:36) (60 - 70)  BP: 121/76 (10-29-20 @ 05:36) (100/61 - 161/81)  RR: 18 (10-29-20 @ 05:36) (12 - 18)  SpO2: 97% (10-29-20 @ 05:36) (97% - 100%)  Wt(kg): --  Daily     Daily       Physical Exam:  Gen: NAD; Patient resting comfortably  HEENT: EOMI, Normocephalic/ atraumatic  CV: RRR, normal S1 + S2, no m/r/g  Lungs:  Normal respiratory effort; clear to auscultation bilaterally  Abd: soft, non-tender; bowel sounds present  Ext: No edema; warm and well perfused    Telemetry: Sinus    Laboratory Data:                        9.9    6.90  )-----------( 224      ( 29 Oct 2020 06:43 )             31.8     10-29    137  |  104  |  41<H>  ----------------------------<  94  4.5   |  19<L>  |  3.72<H>    Ca    8.8      29 Oct 2020 06:43  Phos  4.6     10-29  Mg     1.9     10-29      PT/INR - ( 28 Oct 2020 06:30 )   PT: 11.7 SEC;   INR: 1.02          PTT - ( 28 Oct 2020 06:30 )  PTT:34.1 SEC          Inpatient Medications:  MEDICATIONS  (STANDING):  carvedilol 12.5 milliGRAM(s) Oral every 12 hours  cefTRIAXone   IVPB 1000 milliGRAM(s) IV Intermittent every 24 hours  ferrous    sulfate 325 milliGRAM(s) Oral daily  finasteride 5 milliGRAM(s) Oral daily  pantoprazole    Tablet 40 milliGRAM(s) Oral before breakfast  tamsulosin 0.4 milliGRAM(s) Oral at bedtime      Assessment:  73 year old man with CAD s/p PCI several years ago, HTN, and BPH presents with UTI and a-fib with RVR    Plan of Care:    #A-fib with RVR-  Patient is maintaining.   Continue Coreg as ordered despite sinus bradycardia while sleeping.   Given his recurrent anemia of unclear etiology, I would hold off on anticoagulation at this time.  Echo from earlier this month reviewed- preserved LVEF with no hemodynamically significant valvular disease.    #HTN-  BP acceptable on current regimen.     #CAD s/p PCI-  PCI several years ago.   No bleeding stigmate found on EGD or colonoscopy.  Resume ASA 81 mg if no GI objection.     #ACP (advance care planning)-  Advanced care planning was discussed and all questions were answered.     Over 25 minutes spent on total encounter; more than 50% of the visit was spent counseling and/or coordinating care by the attending physician.      Lionel Miller MD [facility]  Cardiovascular Disease  [phone] 20-Oct-2018

## 2022-11-16 ENCOUNTER — TELEPHONE (OUTPATIENT)
Dept: FAMILY MEDICINE CLINIC | Age: 72
End: 2022-11-16

## 2022-11-16 LAB
SARS-COV-2, NAA 2 DAY TAT: NORMAL
SARS-COV-2, NAA: NOT DETECTED

## 2022-11-16 NOTE — TELEPHONE ENCOUNTER
Patient  came into clinic to see if there is anything you can do to help him with his wife medication. He says that he went to  the medication and they said that it was $97. I gave her  the goodrx card and told him to reach out to insurance as well to see why it was not covered.       [facility] -got verbal permission from wife to talk to her )

## 2022-11-17 DIAGNOSIS — E78.2 MIXED HYPERLIPIDEMIA: Primary | ICD-10-CM

## 2022-11-17 DIAGNOSIS — I10 HTN (HYPERTENSION), BENIGN: ICD-10-CM

## 2022-11-17 DIAGNOSIS — I10 ESSENTIAL HYPERTENSION: ICD-10-CM

## 2022-11-17 RX ORDER — HYDROCHLOROTHIAZIDE 25 MG/1
25 TABLET ORAL DAILY
Qty: 90 TABLET | Refills: 1 | Status: SHIPPED | OUTPATIENT
Start: 2022-11-17

## 2022-11-17 RX ORDER — ATORVASTATIN CALCIUM 20 MG/1
20 TABLET, FILM COATED ORAL DAILY
Qty: 90 TABLET | Refills: 0 | Status: SHIPPED | OUTPATIENT
Start: 2022-11-17

## 2022-11-17 RX ORDER — IRBESARTAN 150 MG/1
150 TABLET ORAL
Qty: 90 TABLET | Refills: 1 | Status: SHIPPED | OUTPATIENT
Start: 2022-11-17

## 2022-11-17 NOTE — PROGRESS NOTES
COVID and flu negative. CMP, CBC wnl. The 10-year ASCVD risk score (Xavier SIN, et al., 2019) is: 14.7% - on moderate intensity statin at present, d/w pt starting on Atorvastatin as pt would benefit from high intensity statin, she is amenable to this change. A1c stable at 6.2%, recommend dietary modifications. Pt called with assistance from [employer]  ID# [id number].

## 2022-11-28 ENCOUNTER — HOSPITAL ENCOUNTER (OUTPATIENT)
Dept: MAMMOGRAPHY | Age: 72
Discharge: HOME | End: 2022-11-28
Attending: STUDENT IN AN ORGANIZED HEALTH CARE EDUCATION/TRAINING PROGRAM
Payer: COMMERCIAL

## 2022-11-28 ENCOUNTER — HOSPITAL ENCOUNTER (OUTPATIENT)
Dept: ULTRASOUND IMAGING | Age: 72
Discharge: HOME | End: 2022-11-28
Attending: STUDENT IN AN ORGANIZED HEALTH CARE EDUCATION/TRAINING PROGRAM
Payer: COMMERCIAL

## 2022-11-28 DIAGNOSIS — R92.8 ABNORMAL MAMMOGRAM: ICD-10-CM

## 2022-11-28 PROCEDURE — 76642 ULTRASOUND BREAST LIMITED: CPT

## 2022-11-28 PROCEDURE — 77061 BREAST TOMOSYNTHESIS UNI: CPT

## 2022-11-28 PROCEDURE — 77065 DX MAMMO INCL CAD UNI: CPT

## 2023-01-09 ENCOUNTER — TELEPHONE (OUTPATIENT)
Dept: FAMILY MEDICINE CLINIC | Age: 73
End: 2023-01-09

## 2023-01-09 NOTE — TELEPHONE ENCOUNTER
This writer reached out to patient to reschedule appointment for this morning. Patient states that they will call back due to patient would need a ride and make sure she have no other appointments scheduled.

## 2023-01-23 ENCOUNTER — OFFICE VISIT (OUTPATIENT)
Dept: FAMILY MEDICINE CLINIC | Age: 73
End: 2023-01-23
Payer: COMMERCIAL

## 2023-01-23 VITALS
WEIGHT: 177.2 LBS | RESPIRATION RATE: 18 BRPM | HEIGHT: 64 IN | TEMPERATURE: 98.7 F | HEART RATE: 85 BPM | SYSTOLIC BLOOD PRESSURE: 116 MMHG | OXYGEN SATURATION: 95 % | BODY MASS INDEX: 30.25 KG/M2 | DIASTOLIC BLOOD PRESSURE: 77 MMHG

## 2023-01-23 DIAGNOSIS — M25.511 RIGHT SHOULDER PAIN, UNSPECIFIED CHRONICITY: Primary | ICD-10-CM

## 2023-01-23 PROCEDURE — 99213 OFFICE O/P EST LOW 20 MIN: CPT | Performed by: FAMILY MEDICINE

## 2023-01-23 PROCEDURE — 3017F COLORECTAL CA SCREEN DOC REV: CPT | Performed by: FAMILY MEDICINE

## 2023-01-23 PROCEDURE — G8510 SCR DEP NEG, NO PLAN REQD: HCPCS | Performed by: FAMILY MEDICINE

## 2023-01-23 PROCEDURE — G8427 DOCREV CUR MEDS BY ELIG CLIN: HCPCS | Performed by: FAMILY MEDICINE

## 2023-01-23 PROCEDURE — 1090F PRES/ABSN URINE INCON ASSESS: CPT | Performed by: FAMILY MEDICINE

## 2023-01-23 PROCEDURE — 1101F PT FALLS ASSESS-DOCD LE1/YR: CPT | Performed by: FAMILY MEDICINE

## 2023-01-23 PROCEDURE — G8400 PT W/DXA NO RESULTS DOC: HCPCS | Performed by: FAMILY MEDICINE

## 2023-01-23 PROCEDURE — G8417 CALC BMI ABV UP PARAM F/U: HCPCS | Performed by: FAMILY MEDICINE

## 2023-01-23 PROCEDURE — G9899 SCRN MAM PERF RSLTS DOC: HCPCS | Performed by: FAMILY MEDICINE

## 2023-01-23 PROCEDURE — G0463 HOSPITAL OUTPT CLINIC VISIT: HCPCS | Performed by: FAMILY MEDICINE

## 2023-01-23 PROCEDURE — 1123F ACP DISCUSS/DSCN MKR DOCD: CPT | Performed by: FAMILY MEDICINE

## 2023-01-23 PROCEDURE — G8536 NO DOC ELDER MAL SCRN: HCPCS | Performed by: FAMILY MEDICINE

## 2023-01-23 NOTE — PROGRESS NOTES
Identified pt with two pt identifiers(name and ). Reviewed record in preparation for visit and have obtained necessary documentation. Chief Complaint   Patient presents with    Arm Pain     Right shoulder, x 6 months, no injury, patient states she takes tylenol and rubs herself with cream in which helps        Health Maintenance Due   Topic    Depression Screen     Colorectal Cancer Screening Combo     Bone Densitometry (Dexa) Screening     Flu Vaccine (1)    Shingles Vaccine (2 of 2)    COVID-19 Vaccine (5 - Booster for [employer] series)    Medicare Yearly Exam        Visit Vitals  /77 (BP 1 Location: Right upper arm, BP Patient Position: Sitting, BP Cuff Size: Large adult)   Pulse 85   Temp 98.7 °F (37.1 °C) (Oral)   Resp 18   Ht 5' 4\" (1.626 m)   Wt 177 lb 3.2 oz (80.4 kg)   SpO2 95%   BMI 30.42 kg/m²         Coordination of Care Questionnaire:  :   1) Have you been to an emergency room, urgent care, or hospitalized since your last visit? If yes, where when, and reason for visit? no       2. Have seen or consulted any other health care provider since your last visit? If yes, where when, and reason for visit? NO        Patient is accompanied by self I have received verbal consent from Tania Jarrett to discuss any/all medical information while they are present in the room.

## 2023-01-23 NOTE — PROGRESS NOTES
[id number] [facility]      Chief Complaint:   Chief Complaint   Patient presents with    Arm Pain     Right shoulder, x 6 months, no injury, patient states she takes tylenol and rubs herself with cream in which helps       History of Present Illness     Patient Identification  Dariela Matson is a 74 y.o. female complains of right shoulder pain for about 6 months. No injury or trauma. No neck pain or radicular sx. Pain is intermittent. No self treatments. No weakness or numbness. Past Medical History:   Diagnosis Date    Anxiety     Hypercholesterolemia     Hypertension     Menopause      History reviewed. No pertinent family history. Current Outpatient Medications   Medication Sig Dispense Refill    atorvastatin (LIPITOR) 20 mg tablet Take 1 Tablet by mouth daily. 90 Tablet 0    hydroCHLOROthiazide (HYDRODIURIL) 25 mg tablet Take 1 Tablet by mouth daily. 90 Tablet 1    irbesartan (AVAPRO) 150 mg tablet Take 1 Tablet by mouth nightly. 90 Tablet 1     Allergies   Allergen Reactions    Penicillins Itching       Review of Systems  Pertinent items are noted in HPI. Physical Exam     Visit Vitals  /77 (BP 1 Location: Right upper arm, BP Patient Position: Sitting, BP Cuff Size: Large adult)   Pulse 85   Temp 98.7 °F (37.1 °C) (Oral)   Resp 18   Ht 5' 4\" (1.626 m)   Wt 177 lb 3.2 oz (80.4 kg)   SpO2 95%   BMI 30.42 kg/m²       GEN: Well appearing. No apparent distress. Responds to all questions appropriately. Lungs: No labored respirations. Talking in complete sentences without difficulty.   Shoulder: right  Deformity: None    ROM:  Forward Flexion: Active: 180     ER (0): Active: 45     IR (0): Active: Behind the body to the level T10  Abduction: Active: 180       Palpation:  AC tenderness: None  SC tenderness: None  Clavicle tenderness: None  Biceps tenderness: None    Strength (0-5/5):  Deltoid - Anterior: 5/5  Deltoid - Posterior: 5/5  Deltoid - Mid: 5/5  Supraspinatus: 5/5  External rotation: 5/5  Internal rotation: 5/5    Neuro/Vascular:  Pulses intact, no edema, and neurologically intact    C-Spine:  Cervical motion: FROM without pain. Cervical tenderness: None      Skin: No obvious rash or skin breakdown. Imaging: Radiographs of the right shoulder personally reviewed and demonstrates no obvious fracture or dislocation. Degenerative changes present. Assessment:    ICD-10-CM ICD-9-CM    1. Right shoulder pain, unspecified chronicity  M25.511 719.41 XR SHOULDER RT AP/LAT MIN 2 V      Right shoulder pain likely due to DJD. Currently she is not having the pain. Discussed treatment options and she will start HEP. She will consider PT in the future. If pain returns and not improving with home treatments then she will consider CSI. Plan:  1. Home Exercise Program as per handout. 2. Ice 15 minutes, three times a day PRN and after exercise. Can alternate with heat for 15 minutes. Medications:    1. Naproxin (Aleve): 220mg 1-2 tablets twice a day PRN. 2. Acetaminophen (Tylenol):  500mg 1-2 tablets every 6 hours as needed for pain.     RTC: PRN

## 2023-03-04 ENCOUNTER — HOSPITAL ENCOUNTER (EMERGENCY)
Age: 73
Discharge: HOME | End: 2023-03-04
Attending: EMERGENCY MEDICINE
Payer: MEDICARE

## 2023-03-04 VITALS
DIASTOLIC BLOOD PRESSURE: 85 MMHG | TEMPERATURE: 97.8 F | HEART RATE: 75 BPM | BODY MASS INDEX: 30.22 KG/M2 | SYSTOLIC BLOOD PRESSURE: 153 MMHG | WEIGHT: 177 LBS | HEIGHT: 64 IN | OXYGEN SATURATION: 100 % | RESPIRATION RATE: 17 BRPM

## 2023-03-04 DIAGNOSIS — N30.01 ACUTE CYSTITIS WITH HEMATURIA: Primary | ICD-10-CM

## 2023-03-04 LAB
APPEARANCE UR: ABNORMAL
BACTERIA URNS QL MICRO: ABNORMAL /HPF
BILIRUB UR QL: NEGATIVE
COLOR UR: ABNORMAL
COMMENT, HOLDF: NORMAL
EPITH CASTS URNS QL MICRO: ABNORMAL /LPF
GLUCOSE UR STRIP.AUTO-MCNC: NEGATIVE MG/DL
HGB UR QL STRIP: ABNORMAL
KETONES UR QL STRIP.AUTO: NEGATIVE MG/DL
LEUKOCYTE ESTERASE UR QL STRIP.AUTO: ABNORMAL
NITRITE UR QL STRIP.AUTO: POSITIVE
PH UR STRIP: 6 (ref 5–8)
PROT UR STRIP-MCNC: 30 MG/DL
RBC #/AREA URNS HPF: ABNORMAL /HPF
SAMPLES BEING HELD,HOLD: NORMAL
SP GR UR REFRACTOMETRY: 1.02 (ref 1–1.03)
UA: UC IF INDICATED,UAUC: ABNORMAL
UROBILINOGEN UR QL STRIP.AUTO: 0.2 EU/DL (ref 0.2–1)
WBC URNS QL MICRO: ABNORMAL /HPF (ref 0–4)

## 2023-03-04 PROCEDURE — 81001 URINALYSIS AUTO W/SCOPE: CPT

## 2023-03-04 PROCEDURE — 99283 EMERGENCY DEPT VISIT LOW MDM: CPT

## 2023-03-04 PROCEDURE — 87077 CULTURE AEROBIC IDENTIFY: CPT

## 2023-03-04 PROCEDURE — 87086 URINE CULTURE/COLONY COUNT: CPT

## 2023-03-04 PROCEDURE — 87186 SC STD MICRODIL/AGAR DIL: CPT

## 2023-03-04 RX ORDER — ACETAMINOPHEN 500 MG
1000 TABLET ORAL ONCE
Status: DISCONTINUED | OUTPATIENT
Start: 2023-03-04 | End: 2023-03-04 | Stop reason: HOSPADM

## 2023-03-04 RX ORDER — LEVOFLOXACIN 250 MG/1
250 TABLET ORAL DAILY
Qty: 5 TABLET | Refills: 0 | Status: SHIPPED | OUTPATIENT
Start: 2023-03-04 | End: 2023-03-09

## 2023-03-04 NOTE — ED TRIAGE NOTES
[facility]- [id number]: Pt reports to ED w/ cc of \"cystitis\" x 3 days. Pt said she had pain w/ urination. Denies OTC pain meds. Endorses back pain.

## 2023-03-04 NOTE — ED PROVIDER NOTES
The history is provided by the patient. A  was used. Abdominal Pain   This is a new problem. The current episode started more than 2 days ago. The problem occurs constantly. The problem has not changed since onset. The pain is located in the suprapubic region. The quality of the pain is cramping. The pain is moderate. Associated symptoms include dysuria, frequency and back pain. Pertinent negatives include no anorexia, no fever, no belching, no diarrhea, no flatus, no hematochezia, no melena, no nausea, no vomiting, no constipation, no hematuria, no headaches, no arthralgias, no myalgias, no trauma and no chest pain. Nothing worsens the pain. The pain is relieved by Nothing. Past Medical History:   Diagnosis Date    Anxiety     Hypercholesterolemia     Hypertension     Menopause        History reviewed. No pertinent surgical history. History reviewed. No pertinent family history.     Social History     Socioeconomic History    Marital status:      Spouse name: Not on file    Number of children: Not on file    Years of education: Not on file    Highest education level: Not on file   Occupational History    Not on file   Tobacco Use    Smoking status: Never    Smokeless tobacco: Never   Vaping Use    Vaping Use: Never used   Substance and Sexual Activity    Alcohol use: Never    Drug use: Never    Sexual activity: Not Currently   Other Topics Concern    Not on file   Social History Narrative    Not on file     Social Determinants of Health     Financial Resource Strain: Low Risk     Difficulty of Paying Living Expenses: Not very hard   Food Insecurity: Food Insecurity Present    Worried About Running Out of Food in the Last Year: Sometimes true    Ran Out of Food in the Last Year: Sometimes true   Transportation Needs: Not on file   Physical Activity: Not on file   Stress: Not on file   Social Connections: Not on file   Intimate Partner Violence: Not on file   Housing Stability: Not on file         ALLERGIES: Penicillins    Review of Systems   Constitutional:  Negative for activity change, chills and fever. HENT:  Negative for nosebleeds, sore throat, trouble swallowing and voice change. Eyes:  Negative for visual disturbance. Respiratory:  Negative for shortness of breath. Cardiovascular:  Negative for chest pain and palpitations. Gastrointestinal:  Positive for abdominal pain. Negative for anorexia, constipation, diarrhea, flatus, hematochezia, melena, nausea and vomiting. Genitourinary:  Positive for dysuria and frequency. Negative for difficulty urinating, hematuria and urgency. Musculoskeletal:  Positive for back pain. Negative for arthralgias, myalgias, neck pain and neck stiffness. Skin:  Negative for color change. Allergic/Immunologic: Negative for immunocompromised state. Neurological:  Negative for dizziness, seizures, syncope, weakness, light-headedness, numbness and headaches. Psychiatric/Behavioral:  Negative for behavioral problems, confusion, hallucinations, self-injury and suicidal ideas. Vitals:    03/04/23 0810   BP: (!) 153/85   Pulse: 75   Resp: 17   Temp: 97.8 °F (36.6 °C)   SpO2: 100%   Weight: 80.3 kg (177 lb)   Height: 5' 4\" (1.626 m)            Physical Exam  Vitals and nursing note reviewed. Constitutional:       General: She is not in acute distress. Appearance: She is well-developed. She is not diaphoretic. HENT:      Head: Atraumatic. Neck:      Trachea: No tracheal deviation. Cardiovascular:      Comments: Warm and well perfused  Pulmonary:      Effort: Pulmonary effort is normal. No respiratory distress. Abdominal:      Tenderness: There is no abdominal tenderness. Musculoskeletal:         General: Normal range of motion. Skin:     General: Skin is warm and dry. Neurological:      Mental Status: She is alert.       Coordination: Coordination normal.   Psychiatric:         Behavior: Behavior normal. Thought Content: Thought content normal.         Judgment: Judgment normal.        Medical Decision Making  Amount and/or Complexity of Data Reviewed  Labs: ordered. Risk  OTC drugs. This is a 49-year-old female with past medical history, review of systems, physical exam as above, presenting with complaints of dysuria, suprapubic pain, back pain. Patient states 3 days worth of symptoms. She denies fevers or chills, nausea or vomiting, diarrhea or constipation. She denies a history of frequent urinary tract infections, or kidney stones. She states she has not taken any medication for her symptoms. Physical exam is remarkable for well-appearing elderly female, in no acute distress noted to be hypertensive, afebrile without tachycardia, satting well on room air. She has a soft nontender abdomen. Discussed via  likely urinary tract infection. Plan to provide Tylenol and obtain UA. We will consider lab work and imaging, reassess, and make a disposition.     Procedures

## 2023-03-05 LAB
BACTERIA SPEC CULT: ABNORMAL
CC UR VC: ABNORMAL
SERVICE CMNT-IMP: ABNORMAL

## 2023-03-06 LAB
BACTERIA SPEC CULT: ABNORMAL
CC UR VC: ABNORMAL
SERVICE CMNT-IMP: ABNORMAL

## 2023-06-16 ENCOUNTER — APPOINTMENT (OUTPATIENT)
Facility: HOSPITAL | Age: 73
End: 2023-06-16
Payer: MEDICARE

## 2023-06-16 ENCOUNTER — HOSPITAL ENCOUNTER (EMERGENCY)
Facility: HOSPITAL | Age: 73
Discharge: HOME | End: 2023-06-16
Attending: EMERGENCY MEDICINE
Payer: MEDICARE

## 2023-06-16 VITALS
HEART RATE: 68 BPM | BODY MASS INDEX: 29.02 KG/M2 | TEMPERATURE: 97.9 F | OXYGEN SATURATION: 99 % | RESPIRATION RATE: 16 BRPM | SYSTOLIC BLOOD PRESSURE: 134 MMHG | WEIGHT: 170 LBS | HEIGHT: 64 IN | DIASTOLIC BLOOD PRESSURE: 71 MMHG

## 2023-06-16 DIAGNOSIS — M54.12 CERVICAL RADICULOPATHY: Primary | ICD-10-CM

## 2023-06-16 PROCEDURE — 73030 X-RAY EXAM OF SHOULDER: CPT

## 2023-06-16 PROCEDURE — 99284 EMERGENCY DEPT VISIT MOD MDM: CPT

## 2023-06-16 PROCEDURE — 72125 CT NECK SPINE W/O DYE: CPT

## 2023-06-16 RX ORDER — NAPROXEN 500 MG/1
500 TABLET ORAL EVERY 12 HOURS PRN
Qty: 20 TABLET | Refills: 0 | Status: SHIPPED | OUTPATIENT
Start: 2023-06-16

## 2023-06-16 ASSESSMENT — PAIN SCALES - GENERAL: PAINLEVEL_OUTOF10: 10

## 2023-06-16 ASSESSMENT — PAIN DESCRIPTION - DESCRIPTORS: DESCRIPTORS: SHARP

## 2023-06-16 ASSESSMENT — LIFESTYLE VARIABLES
HOW OFTEN DO YOU HAVE A DRINK CONTAINING ALCOHOL: NEVER
HOW MANY STANDARD DRINKS CONTAINING ALCOHOL DO YOU HAVE ON A TYPICAL DAY: PATIENT DOES NOT DRINK

## 2023-06-16 ASSESSMENT — PAIN DESCRIPTION - LOCATION: LOCATION: ARM;SHOULDER

## 2023-06-16 ASSESSMENT — PAIN DESCRIPTION - ORIENTATION: ORIENTATION: RIGHT

## 2023-06-16 NOTE — ED PROVIDER NOTES
[facility] EMERGENCY DEPT  EMERGENCY DEPARTMENT ENCOUNTER      Pt Name: Radha Naidu  MRN: [id number]  [place] 1950  Date of evaluation: 6/16/2023  Provider: Kieran Huerta MD      HISTORY OF PRESENT ILLNESS      64-year-old female with history of high cholesterol, anxiety, hypertension presents to the emergency department with chief complaint of 3 weeks of worsening right shoulder pain. Pain radiates down her right arm. She denies any specific injury. She has been taking Tylenol without significant relief. Pain seems to worse when she is making tortillas. The history is provided by the patient and medical records. A  was used. Nursing Notes were reviewed. REVIEW OF SYSTEMS         Review of Systems        PAST MEDICAL HISTORY     Past Medical History:   Diagnosis Date    Anxiety     Hypercholesterolemia     Hypertension     Menopause          SURGICAL HISTORY     No past surgical history on file. CURRENT MEDICATIONS       Previous Medications    ATORVASTATIN (LIPITOR) 20 MG TABLET    Take 20 mg by mouth daily    HYDROCHLOROTHIAZIDE (HYDRODIURIL) 25 MG TABLET    Take 25 mg by mouth daily    IRBESARTAN (AVAPRO) 150 MG TABLET    Take 1 tablet by mouth nightly       ALLERGIES     Penicillins    FAMILY HISTORY     No family history on file.        SOCIAL HISTORY       Social History     Socioeconomic History    Marital status:    Tobacco Use    Smoking status: Never    Smokeless tobacco: Never   Substance and Sexual Activity    Alcohol use: Never    Drug use: Never     Social Determinants of Health     Financial Resource Strain: Low Risk     Difficulty of Paying Living Expenses: Not very hard   Food Insecurity: Food Insecurity Present    Worried About Running Out of Food in the Last Year: Sometimes true    Ran Out of Food in the Last Year: Sometimes true         PHYSICAL EXAM       ED Triage Vitals   BP Temp Temp Source Pulse Respirations SpO2 Height Weight -

## 2023-07-24 ENCOUNTER — OFFICE VISIT (OUTPATIENT)
Age: 73
End: 2023-07-24
Payer: MEDICARE

## 2023-07-24 VITALS
DIASTOLIC BLOOD PRESSURE: 67 MMHG | OXYGEN SATURATION: 96 % | HEIGHT: 64 IN | BODY MASS INDEX: 29.53 KG/M2 | RESPIRATION RATE: 18 BRPM | HEART RATE: 86 BPM | TEMPERATURE: 97.8 F | SYSTOLIC BLOOD PRESSURE: 102 MMHG | WEIGHT: 173 LBS

## 2023-07-24 DIAGNOSIS — M25.511 RIGHT SHOULDER PAIN, UNSPECIFIED CHRONICITY: Primary | ICD-10-CM

## 2023-07-24 DIAGNOSIS — Z78.0 POSTMENOPAUSAL STATE: ICD-10-CM

## 2023-07-24 DIAGNOSIS — R73.03 PREDIABETES: ICD-10-CM

## 2023-07-24 DIAGNOSIS — Z00.00 ENCOUNTER FOR ANNUAL PHYSICAL EXAM: ICD-10-CM

## 2023-07-24 DIAGNOSIS — Z78.9 LANGUAGE BARRIER AFFECTING HEALTH CARE: ICD-10-CM

## 2023-07-24 LAB — HBA1C MFR BLD: 6.5 %

## 2023-07-24 PROCEDURE — 1123F ACP DISCUSS/DSCN MKR DOCD: CPT | Performed by: FAMILY MEDICINE

## 2023-07-24 PROCEDURE — 83036 HEMOGLOBIN GLYCOSYLATED A1C: CPT

## 2023-07-24 PROCEDURE — 99214 OFFICE O/P EST MOD 30 MIN: CPT

## 2023-07-24 RX ORDER — SIMVASTATIN 20 MG
20 TABLET ORAL NIGHTLY
COMMUNITY
Start: 2023-06-12 | End: 2023-07-24

## 2023-07-24 RX ORDER — GABAPENTIN 100 MG/1
100 CAPSULE ORAL 3 TIMES DAILY
COMMUNITY

## 2023-07-24 ASSESSMENT — PATIENT HEALTH QUESTIONNAIRE - PHQ9
6. FEELING BAD ABOUT YOURSELF - OR THAT YOU ARE A FAILURE OR HAVE LET YOURSELF OR YOUR FAMILY DOWN: 0
2. FEELING DOWN, DEPRESSED OR HOPELESS: 0
10. IF YOU CHECKED OFF ANY PROBLEMS, HOW DIFFICULT HAVE THESE PROBLEMS MADE IT FOR YOU TO DO YOUR WORK, TAKE CARE OF THINGS AT HOME, OR GET ALONG WITH OTHER PEOPLE: 0
5. POOR APPETITE OR OVEREATING: 1
SUM OF ALL RESPONSES TO PHQ QUESTIONS 1-9: 6
7. TROUBLE CONCENTRATING ON THINGS, SUCH AS READING THE NEWSPAPER OR WATCHING TELEVISION: 0
SUM OF ALL RESPONSES TO PHQ QUESTIONS 1-9: 6
8. MOVING OR SPEAKING SO SLOWLY THAT OTHER PEOPLE COULD HAVE NOTICED. OR THE OPPOSITE, BEING SO FIGETY OR RESTLESS THAT YOU HAVE BEEN MOVING AROUND A LOT MORE THAN USUAL: 0
9. THOUGHTS THAT YOU WOULD BE BETTER OFF DEAD, OR OF HURTING YOURSELF: 0
4. FEELING TIRED OR HAVING LITTLE ENERGY: 1
3. TROUBLE FALLING OR STAYING ASLEEP: 1
SUM OF ALL RESPONSES TO PHQ QUESTIONS 1-9: 6
SUM OF ALL RESPONSES TO PHQ QUESTIONS 1-9: 6
SUM OF ALL RESPONSES TO PHQ9 QUESTIONS 1 & 2: 3
1. LITTLE INTEREST OR PLEASURE IN DOING THINGS: 3

## 2023-07-26 ENCOUNTER — TELEPHONE (OUTPATIENT)
Age: 73
End: 2023-07-26

## 2023-07-26 NOTE — TELEPHONE ENCOUNTER
Pt came in requesting a refill for her Atorvastastin to be sent to the pharmacy. Pt stated she is completely out of the medication. Thank you!

## 2023-07-27 RX ORDER — ATORVASTATIN CALCIUM 20 MG/1
20 TABLET, FILM COATED ORAL DAILY
Qty: 120 TABLET | Refills: 1 | Status: SHIPPED | OUTPATIENT
Start: 2023-07-27 | End: 2023-07-28 | Stop reason: SDUPTHER

## 2023-07-27 ASSESSMENT — ENCOUNTER SYMPTOMS
CONSTIPATION: 0
SORE THROAT: 0
COUGH: 0
VOMITING: 1
RHINORRHEA: 0
NAUSEA: 0
WHEEZING: 0
ABDOMINAL PAIN: 0
COLOR CHANGE: 0
DIARRHEA: 0
BLOOD IN STOOL: 0
SHORTNESS OF BREATH: 0
CHEST TIGHTNESS: 0

## 2023-07-28 DIAGNOSIS — R73.03 PREDIABETES: ICD-10-CM

## 2023-07-28 DIAGNOSIS — Z00.00 ENCOUNTER FOR ANNUAL PHYSICAL EXAM: ICD-10-CM

## 2023-07-28 DIAGNOSIS — E78.2 MIXED HYPERLIPIDEMIA: Primary | ICD-10-CM

## 2023-07-28 RX ORDER — ATORVASTATIN CALCIUM 20 MG/1
20 TABLET, FILM COATED ORAL DAILY
Qty: 120 TABLET | Refills: 1 | Status: SHIPPED | OUTPATIENT
Start: 2023-07-28

## 2023-07-28 NOTE — TELEPHONE ENCOUNTER
This writer attempted to call the Pt , but failed to reach. LVM that her refill request has been sent to her pharmacy. Return call number has been left in case of any question.

## 2023-09-02 DIAGNOSIS — I10 ESSENTIAL (PRIMARY) HYPERTENSION: ICD-10-CM

## 2023-09-07 RX ORDER — HYDROCHLOROTHIAZIDE 25 MG/1
TABLET ORAL
Qty: 90 TABLET | Refills: 1 | Status: SHIPPED | OUTPATIENT
Start: 2023-09-07

## 2023-10-23 ENCOUNTER — HOSPITAL ENCOUNTER (OUTPATIENT)
Facility: HOSPITAL | Age: 73
Discharge: HOME | End: 2023-10-26
Payer: MEDICARE

## 2023-10-23 ENCOUNTER — TRANSCRIBE ORDERS (OUTPATIENT)
Facility: HOSPITAL | Age: 73
End: 2023-10-23

## 2023-10-23 DIAGNOSIS — Z12.31 OTHER SCREENING MAMMOGRAM: ICD-10-CM

## 2023-10-23 DIAGNOSIS — Z12.31 OTHER SCREENING MAMMOGRAM: Primary | ICD-10-CM

## 2023-10-23 PROCEDURE — 77063 BREAST TOMOSYNTHESIS BI: CPT

## 2024-04-15 DIAGNOSIS — E78.2 MIXED HYPERLIPIDEMIA: ICD-10-CM

## 2024-04-17 RX ORDER — ATORVASTATIN CALCIUM 20 MG/1
20 TABLET, FILM COATED ORAL DAILY
Qty: 120 TABLET | Refills: 1 | Status: CANCELLED | OUTPATIENT
Start: 2024-04-17

## 2024-04-17 NOTE — TELEPHONE ENCOUNTER
Medication Refill Request    Rachael Guzman is requesting a refill of the following medication(s):   Requested Prescriptions     Pending Prescriptions Disp Refills    atorvastatin (LIPITOR) 20 MG tablet 90 tablet 1     Sig: Take 1 tablet by mouth daily    atorvastatin (LIPITOR) 20 MG tablet 120 tablet 1     Sig: Take 1 tablet by mouth daily        Listed PCP is Pinky Browning MD   Last provider to prescribe medication: Dr. Galvez  Last Date of Medication Prescribed: 07/30/2023   Date of Last Office Visit at [facility]: 07/04/2023   Last Labs:  Lab Results   Component Value Date    CHOL 195 11/14/2022    TRIG 127 11/14/2022    HDL 42 11/14/2022    LDLCALC 127.6 (H) 11/14/2022    CHOLHDLRATIO 4.6 11/14/2022       Future Appointment: No future appointments.    Refill Request Note: - Advised to continue on atorvastatin and discontinue simvastatin. On 07/24/2023 and to follow up in 4 Weeks for MWV. Message will be forwarded to PSR to assist with scheduling     Please send refill to:    [facility]/pharmacy #[id number] - [place], [place] - [address] - P [phone] - F [phone] [address] [facility] [address]  Phone: [phone] Fax: [phone]

## 2024-04-22 NOTE — TELEPHONE ENCOUNTER
Pt is not due for a MWV until July and will call back the 1st of May to get the appointment scheduled. Pt was advised that the prescriptions are waiting to be signed by the provider. Pt advised that she is out of the medication (Atorvastatin).     Please send to:   [facility] #[id number]  P [phone]  F [phone]    Please assist with this matter.     [facility] - PSR

## 2024-04-23 ENCOUNTER — APPOINTMENT (OUTPATIENT)
Facility: HOSPITAL | Age: 74
End: 2024-04-23
Payer: MEDICARE

## 2024-04-23 ENCOUNTER — HOSPITAL ENCOUNTER (EMERGENCY)
Facility: HOSPITAL | Age: 74
Discharge: HOME | End: 2024-04-23
Attending: EMERGENCY MEDICINE
Payer: MEDICARE

## 2024-04-23 VITALS
OXYGEN SATURATION: 94 % | RESPIRATION RATE: 20 BRPM | TEMPERATURE: 97.8 F | DIASTOLIC BLOOD PRESSURE: 69 MMHG | HEART RATE: 64 BPM | WEIGHT: 173 LBS | SYSTOLIC BLOOD PRESSURE: 133 MMHG | HEIGHT: 64 IN | BODY MASS INDEX: 29.53 KG/M2

## 2024-04-23 DIAGNOSIS — N28.9 RENAL LESION: ICD-10-CM

## 2024-04-23 DIAGNOSIS — N39.0 UTI (URINARY TRACT INFECTION), BACTERIAL: Primary | ICD-10-CM

## 2024-04-23 DIAGNOSIS — A49.9 UTI (URINARY TRACT INFECTION), BACTERIAL: Primary | ICD-10-CM

## 2024-04-23 LAB
ALBUMIN SERPL-MCNC: 4.1 G/DL (ref 3.5–5.2)
ALBUMIN/GLOB SERPL: 1.1 (ref 1.1–2.2)
ALP SERPL-CCNC: 116 U/L (ref 35–104)
ALT SERPL-CCNC: 15 U/L (ref 10–35)
ANION GAP SERPL CALC-SCNC: 12 MMOL/L (ref 5–15)
APPEARANCE UR: CLEAR
AST SERPL-CCNC: 30 U/L (ref 10–35)
BACTERIA URNS QL MICRO: ABNORMAL /HPF
BASOPHILS # BLD: 0.1 K/UL (ref 0–1)
BASOPHILS NFR BLD: 1 % (ref 0–1)
BILIRUB SERPL-MCNC: 0.4 MG/DL (ref 0.2–1)
BILIRUB UR QL: NEGATIVE
BUN SERPL-MCNC: 15 MG/DL (ref 8–23)
BUN/CREAT SERPL: 25 (ref 12–20)
CALCIUM SERPL-MCNC: 9.5 MG/DL (ref 8.8–10.2)
CHLORIDE SERPL-SCNC: 99 MMOL/L (ref 98–107)
CO2 SERPL-SCNC: 26 MMOL/L (ref 22–29)
COLOR UR: ABNORMAL
COMMENT:: NORMAL
CREAT SERPL-MCNC: 0.61 MG/DL (ref 0.5–0.9)
DIFFERENTIAL METHOD BLD: ABNORMAL
EKG ATRIAL RATE: 85 BPM
EKG DIAGNOSIS: NORMAL
EKG P AXIS: 28 DEGREES
EKG P-R INTERVAL: 126 MS
EKG Q-T INTERVAL: 374 MS
EKG QRS DURATION: 78 MS
EKG QTC CALCULATION (BAZETT): 445 MS
EKG R AXIS: -31 DEGREES
EKG T AXIS: -15 DEGREES
EKG VENTRICULAR RATE: 85 BPM
EOSINOPHIL # BLD: 0.3 K/UL (ref 0–0.4)
EOSINOPHIL NFR BLD: 2 %
EPITH CASTS URNS QL MICRO: ABNORMAL /LPF
ERYTHROCYTE [DISTWIDTH] IN BLOOD BY AUTOMATED COUNT: 13.5 % (ref 11.5–14.5)
GLOBULIN SER CALC-MCNC: 3.9 G/DL (ref 2–4)
GLUCOSE SERPL-MCNC: 124 MG/DL (ref 65–100)
GLUCOSE UR STRIP.AUTO-MCNC: NEGATIVE MG/DL
HCT VFR BLD AUTO: 40.6 % (ref 35–47)
HGB BLD-MCNC: 13.5 G/DL (ref 11.5–16)
HGB UR QL STRIP: NEGATIVE
IMM GRANULOCYTES # BLD AUTO: 0.1 K/UL (ref 0–0.04)
IMM GRANULOCYTES NFR BLD AUTO: 0 % (ref 0–0.5)
KETONES UR QL STRIP.AUTO: NEGATIVE MG/DL
LEUKOCYTE ESTERASE UR QL STRIP.AUTO: ABNORMAL
LIPASE SERPL-CCNC: 30 U/L (ref 13–60)
LYMPHOCYTES # BLD: 3 K/UL (ref 0.8–3.5)
LYMPHOCYTES NFR BLD: 26 % (ref 12–49)
MCH RBC QN AUTO: 30.5 PG (ref 26–34)
MCHC RBC AUTO-ENTMCNC: 33.3 G/DL (ref 30–36.5)
MCV RBC AUTO: 91.9 FL (ref 80–99)
MONOCYTES # BLD: 0.7 K/UL (ref 0–1)
MONOCYTES NFR BLD: 6 % (ref 5–13)
MUCOUS THREADS URNS QL MICRO: ABNORMAL /LPF
NEUTS SEG # BLD: 7.1 K/UL (ref 1.8–8)
NEUTS SEG NFR BLD: 65 % (ref 32–75)
NITRITE UR QL STRIP.AUTO: NEGATIVE
NRBC # BLD: 0 K/UL (ref 0–0.01)
NRBC BLD-RTO: 0 PER 100 WBC
PH UR STRIP: 6.5 (ref 5–8)
PLATELET # BLD AUTO: 333 K/UL (ref 150–400)
PMV BLD AUTO: 11.4 FL (ref 8.9–12.9)
POTASSIUM SERPL-SCNC: 4 MMOL/L (ref 3.5–5.1)
PROT SERPL-MCNC: 8 G/DL (ref 6.4–8.3)
PROT UR STRIP-MCNC: NEGATIVE MG/DL
RBC # BLD AUTO: 4.42 M/UL (ref 3.8–5.2)
RBC #/AREA URNS HPF: ABNORMAL /HPF
SODIUM SERPL-SCNC: 137 MMOL/L (ref 136–145)
SP GR UR REFRACTOMETRY: 1.01 (ref 1–1.03)
SPECIMEN HOLD: NORMAL
TROPONIN T SERPL HS-MCNC: 6.6 NG/L (ref 0–14)
URINE CULTURE IF INDICATED: ABNORMAL
UROBILINOGEN UR QL STRIP.AUTO: 0.2 EU/DL (ref 0.2–1)
WBC # BLD AUTO: 11.2 K/UL (ref 3.6–11)
WBC URNS QL MICRO: ABNORMAL /HPF (ref 0–4)

## 2024-04-23 PROCEDURE — 6360000004 HC RX CONTRAST MEDICATION: Performed by: EMERGENCY MEDICINE

## 2024-04-23 PROCEDURE — 83690 ASSAY OF LIPASE: CPT

## 2024-04-23 PROCEDURE — 71046 X-RAY EXAM CHEST 2 VIEWS: CPT

## 2024-04-23 PROCEDURE — 81001 URINALYSIS AUTO W/SCOPE: CPT

## 2024-04-23 PROCEDURE — 80053 COMPREHEN METABOLIC PANEL: CPT

## 2024-04-23 PROCEDURE — 96374 THER/PROPH/DIAG INJ IV PUSH: CPT

## 2024-04-23 PROCEDURE — 36415 COLL VENOUS BLD VENIPUNCTURE: CPT

## 2024-04-23 PROCEDURE — 6360000002 HC RX W HCPCS: Performed by: NURSE PRACTITIONER

## 2024-04-23 PROCEDURE — 99285 EMERGENCY DEPT VISIT HI MDM: CPT

## 2024-04-23 PROCEDURE — 74177 CT ABD & PELVIS W/CONTRAST: CPT

## 2024-04-23 PROCEDURE — 84484 ASSAY OF TROPONIN QUANT: CPT

## 2024-04-23 PROCEDURE — 93005 ELECTROCARDIOGRAM TRACING: CPT | Performed by: EMERGENCY MEDICINE

## 2024-04-23 PROCEDURE — 93010 ELECTROCARDIOGRAM REPORT: CPT | Performed by: INTERNAL MEDICINE

## 2024-04-23 PROCEDURE — 85025 COMPLETE CBC W/AUTO DIFF WBC: CPT

## 2024-04-23 RX ORDER — KETOROLAC TROMETHAMINE 30 MG/ML
15 INJECTION, SOLUTION INTRAMUSCULAR; INTRAVENOUS
Status: COMPLETED | OUTPATIENT
Start: 2024-04-23 | End: 2024-04-23

## 2024-04-23 RX ORDER — CEPHALEXIN 500 MG/1
500 CAPSULE ORAL 3 TIMES DAILY
Qty: 21 CAPSULE | Refills: 0 | Status: SHIPPED | OUTPATIENT
Start: 2024-04-23 | End: 2024-04-30

## 2024-04-23 RX ORDER — FAMOTIDINE 20 MG/1
20 TABLET, FILM COATED ORAL 2 TIMES DAILY
Qty: 60 TABLET | Refills: 0 | Status: SHIPPED | OUTPATIENT
Start: 2024-04-23

## 2024-04-23 RX ADMIN — IOPAMIDOL 100 ML: 755 INJECTION, SOLUTION INTRAVENOUS at 10:31

## 2024-04-23 RX ADMIN — KETOROLAC TROMETHAMINE 15 MG: 30 INJECTION, SOLUTION INTRAMUSCULAR at 10:01

## 2024-04-23 ASSESSMENT — PAIN SCALES - GENERAL
PAINLEVEL_OUTOF10: 5
PAINLEVEL_OUTOF10: 5

## 2024-04-23 ASSESSMENT — PAIN DESCRIPTION - DESCRIPTORS
DESCRIPTORS: ACHING
DESCRIPTORS: ACHING

## 2024-04-23 ASSESSMENT — PAIN - FUNCTIONAL ASSESSMENT: PAIN_FUNCTIONAL_ASSESSMENT: 0-10

## 2024-04-23 ASSESSMENT — PAIN DESCRIPTION - ORIENTATION
ORIENTATION: UPPER
ORIENTATION: UPPER

## 2024-04-23 ASSESSMENT — PAIN DESCRIPTION - LOCATION
LOCATION: ABDOMEN
LOCATION: ABDOMEN

## 2024-04-23 ASSESSMENT — HEART SCORE: ECG: NORMAL

## 2024-04-23 NOTE — TELEPHONE ENCOUNTER
Medication Refill Request    Rachael Guzman is requesting a refill of the following medication(s):   Requested Prescriptions     Pending Prescriptions Disp Refills    atorvastatin (LIPITOR) 20 MG tablet 90 tablet 1     Sig: Take 1 tablet by mouth daily        Listed PCP is Gracious Pinky Galvez MD   Last provider to prescribe medication: Dr. Galvez  Last Date of Medication Prescribed: 07/28/2023   Date of Last Office Visit at [facility]: 07/24/2023     FUTURE APPOINTMENT:   Future Appointments   Date Time Provider Department Center   5/7/2024  1:20 PM Ervin Smith MD [facility] BS AMB       Please send refill to:    [facility]/pharmacy #[id number] - [place], [place] - [address] - P [phone] - F [phone] [address] [facility] [address]  Phone: [phone] Fax: [phone]      Please review request and approve or deny with recommendations.

## 2024-04-23 NOTE — ED TRIAGE NOTES
Pt arrives with the c/c of upper abdominal pain that has been going on for the last couple days that is intermittent. Pt denies any n/v/d. Wolof interpretor used for triage [id number], provider at bedside to evaluate patient at this time.

## 2024-04-23 NOTE — ED NOTES
[id number]- Rounding on patient at this time, denies any needs; updated patient on MD to round soon with results.

## 2024-04-23 NOTE — ED PROVIDER NOTES
SpO2 Height Weight - Scale   (!) 148/91 97.8 °F (36.6 °C) Oral 84 16 94 % 1.626 m (5' 4\") 78.5 kg (173 lb)       Body mass index is 29.7 kg/m².    Physical Exam  Vitals and nursing note reviewed.   Constitutional:       Appearance: She is well-developed.   HENT:      Head: Normocephalic and atraumatic.      Mouth/Throat:      Mouth: Mucous membranes are moist.   Eyes:      General: No scleral icterus.  Cardiovascular:      Rate and Rhythm: Normal rate and regular rhythm.      Heart sounds: No murmur heard.  Pulmonary:      Effort: Pulmonary effort is normal. No respiratory distress.      Breath sounds: Normal breath sounds. No stridor. No wheezing, rhonchi or rales.   Chest:      Chest wall: No tenderness.   Abdominal:      General: Abdomen is protuberant. Bowel sounds are normal. There is no distension. There are no signs of injury.      Palpations: Abdomen is soft.      Tenderness: There is abdominal tenderness in the right upper quadrant, epigastric area and left upper quadrant. There is no right CVA tenderness, left CVA tenderness, guarding or rebound. Negative signs include Wright's sign, Rovsing's sign, McBurney's sign and psoas sign.   Skin:     General: Skin is warm and dry.      Capillary Refill: Capillary refill takes less than 2 seconds.      Coloration: Skin is not jaundiced.      Findings: No rash.   Neurological:      General: No focal deficit present.      Mental Status: She is alert and oriented to person, place, and time.   Psychiatric:         Mood and Affect: Mood normal.         Behavior: Behavior normal.         DIAGNOSTIC RESULTS     EKG: All EKG's are interpreted by the Emergency Department Physician who either signs or Co-signs this chart in the absence of a cardiologist.        RADIOLOGY:   Non-plain film images such as CT, Ultrasound and MRI are read by the radiologist. Plain radiographic images are visualized and preliminarily interpreted by the emergency physician with the below

## 2024-04-24 RX ORDER — ATORVASTATIN CALCIUM 20 MG/1
20 TABLET, FILM COATED ORAL DAILY
Qty: 30 TABLET | Refills: 0 | Status: SHIPPED | OUTPATIENT
Start: 2024-04-24

## 2024-05-07 ENCOUNTER — OFFICE VISIT (OUTPATIENT)
Age: 74
End: 2024-05-07
Payer: MEDICARE

## 2024-05-07 VITALS
DIASTOLIC BLOOD PRESSURE: 77 MMHG | SYSTOLIC BLOOD PRESSURE: 119 MMHG | OXYGEN SATURATION: 95 % | WEIGHT: 179.8 LBS | HEART RATE: 79 BPM | TEMPERATURE: 97.9 F | HEIGHT: 61 IN | BODY MASS INDEX: 33.95 KG/M2

## 2024-05-07 DIAGNOSIS — I10 ESSENTIAL (PRIMARY) HYPERTENSION: ICD-10-CM

## 2024-05-07 DIAGNOSIS — E78.2 MIXED HYPERLIPIDEMIA: ICD-10-CM

## 2024-05-07 DIAGNOSIS — R45.89 DEPRESSED MOOD: ICD-10-CM

## 2024-05-07 DIAGNOSIS — R41.81 AGE-RELATED COGNITIVE DECLINE: ICD-10-CM

## 2024-05-07 DIAGNOSIS — Z00.00 MEDICARE ANNUAL WELLNESS VISIT, SUBSEQUENT: Primary | ICD-10-CM

## 2024-05-07 PROCEDURE — 99213 OFFICE O/P EST LOW 20 MIN: CPT | Performed by: STUDENT IN AN ORGANIZED HEALTH CARE EDUCATION/TRAINING PROGRAM

## 2024-05-07 PROCEDURE — 1123F ACP DISCUSS/DSCN MKR DOCD: CPT | Performed by: STUDENT IN AN ORGANIZED HEALTH CARE EDUCATION/TRAINING PROGRAM

## 2024-05-07 PROCEDURE — G0439 PPPS, SUBSEQ VISIT: HCPCS | Performed by: STUDENT IN AN ORGANIZED HEALTH CARE EDUCATION/TRAINING PROGRAM

## 2024-05-07 PROCEDURE — 3074F SYST BP LT 130 MM HG: CPT | Performed by: STUDENT IN AN ORGANIZED HEALTH CARE EDUCATION/TRAINING PROGRAM

## 2024-05-07 PROCEDURE — 3078F DIAST BP <80 MM HG: CPT | Performed by: STUDENT IN AN ORGANIZED HEALTH CARE EDUCATION/TRAINING PROGRAM

## 2024-05-07 RX ORDER — ATORVASTATIN CALCIUM 20 MG/1
20 TABLET, FILM COATED ORAL DAILY
Qty: 30 TABLET | Refills: 0 | Status: SHIPPED | OUTPATIENT
Start: 2024-05-07

## 2024-05-07 RX ORDER — HYDROCHLOROTHIAZIDE 25 MG/1
25 TABLET ORAL DAILY
Qty: 90 TABLET | Refills: 1 | Status: SHIPPED | OUTPATIENT
Start: 2024-05-07

## 2024-05-07 RX ORDER — CITALOPRAM HYDROBROMIDE 10 MG/1
10 TABLET ORAL DAILY
Qty: 90 TABLET | Refills: 0 | Status: SHIPPED | OUTPATIENT
Start: 2024-05-07

## 2024-05-07 ASSESSMENT — PATIENT HEALTH QUESTIONNAIRE - PHQ9
4. FEELING TIRED OR HAVING LITTLE ENERGY: MORE THAN HALF THE DAYS
SUM OF ALL RESPONSES TO PHQ QUESTIONS 1-9: 18
3. TROUBLE FALLING OR STAYING ASLEEP: MORE THAN HALF THE DAYS
2. FEELING DOWN, DEPRESSED OR HOPELESS: NEARLY EVERY DAY
9. THOUGHTS THAT YOU WOULD BE BETTER OFF DEAD, OR OF HURTING YOURSELF: NOT AT ALL
1. LITTLE INTEREST OR PLEASURE IN DOING THINGS: NEARLY EVERY DAY
SUM OF ALL RESPONSES TO PHQ QUESTIONS 1-9: 18
8. MOVING OR SPEAKING SO SLOWLY THAT OTHER PEOPLE COULD HAVE NOTICED. OR THE OPPOSITE, BEING SO FIGETY OR RESTLESS THAT YOU HAVE BEEN MOVING AROUND A LOT MORE THAN USUAL: NEARLY EVERY DAY
6. FEELING BAD ABOUT YOURSELF - OR THAT YOU ARE A FAILURE OR HAVE LET YOURSELF OR YOUR FAMILY DOWN: NEARLY EVERY DAY
SUM OF ALL RESPONSES TO PHQ9 QUESTIONS 1 & 2: 6
SUM OF ALL RESPONSES TO PHQ QUESTIONS 1-9: 18
SUM OF ALL RESPONSES TO PHQ QUESTIONS 1-9: 18
7. TROUBLE CONCENTRATING ON THINGS, SUCH AS READING THE NEWSPAPER OR WATCHING TELEVISION: MORE THAN HALF THE DAYS

## 2024-05-07 ASSESSMENT — LIFESTYLE VARIABLES
HOW MANY STANDARD DRINKS CONTAINING ALCOHOL DO YOU HAVE ON A TYPICAL DAY: PATIENT DECLINED
HOW OFTEN DO YOU HAVE A DRINK CONTAINING ALCOHOL: NEVER

## 2024-05-07 NOTE — PROGRESS NOTES
Rachael Guzman is a 74 y.o. female      Chief Complaint   Patient presents with    Medicare AWV       \"Have you been to the ER, urgent care clinic since your last visit?  Hospitalized since your last visit?\"    YES - When: approximately 2  weeks ago.  Where and Why: [facility] Stomach pain.    “Have you seen or consulted any other health care providers outside of [facility] since your last visit?”    NO            Click Here for Release of Records Request    Vitals:    05/07/24 1314   BP: 119/77   Site: Right Upper Arm   Position: Sitting   Cuff Size: Large Adult   Pulse: 79   Temp: 97.9 °F (36.6 °C)   TempSrc: Oral   SpO2: 95%   Weight: 81.6 kg (179 lb 12.8 oz)   Height: 1.54 m (5' 0.63\")           Medication Reconciliation Completed, changes notes. Please Update medication list.

## 2024-05-07 NOTE — PROGRESS NOTES
Medicare Annual Wellness Visit    Rachael Guzman is here for Medicare AWV    Assessment & Plan   Medicare annual wellness visit, subsequent  -     [facility] -  Referral to ACP Clinical Specialist  Essential (primary) hypertension  -     hydroCHLOROthiazide (HYDRODIURIL) 25 MG tablet; Take 1 tablet by mouth daily, Disp-90 tablet, R-1Normal  Mixed hyperlipidemia  -     atorvastatin (LIPITOR) 20 MG tablet; Take 1 tablet by mouth daily, Disp-30 tablet, R-0Normal  Depressed mood  - Had been on SSRI in the past. Requests to restart. No SI/SIT  -     citalopram (CELEXA) 10 MG tablet; Take 1 tablet by mouth daily, Disp-90 tablet, R-0Normal  Age-related cognitive decline  Resources provided. Suspect poor education is a component of clock draw failure. She is unable to read or write. She and  agree she has been forgetting more day to day things. Manages ADLs and IADLs reasonably and is well supported by her son who lives with them. No evidence of pathological process.    Recommendations for Preventive Services Due: see orders and patient instructions/AVS.  Recommended screening schedule for the next 5-10 years is provided to the patient in written form: see Patient Instructions/AVS.     Return in 4 weeks (on 6/4/2024) for depression followup.     Subjective       Patient's complete Health Risk Assessment and screening values have been reviewed and are found in Flowsheets. The following problems were reviewed today and where indicated follow up appointments were made and/or referrals ordered.    Positive Risk Factor Screenings with Interventions:       Cognitive:   Clock Drawing Test (CDT): (!) Abnormal (Patient refused, says she can't read or write, even after showing her the clock on the wall. She wrote \"11:10\" inside the [place].)  Words recalled: 2 Words Recalled  Total Score: (!) 2  Total Score Interpretation: Abnormal Mini-Cog  Interventions:  Discussed caretaker roles, sleep hygiene, fall-proofing home, discouraging

## 2024-05-08 ENCOUNTER — CLINICAL DOCUMENTATION (OUTPATIENT)
Dept: CASE MANAGEMENT | Age: 74
End: 2024-05-08

## 2024-05-08 NOTE — ACP (ADVANCE CARE PLANNING)
Advance Care Planning   Ambulatory ACP Specialist Patient Outreach    Date:  5/8/2024    ACP Specialist:  Christianne Razo LCSW    Outreach call to patient in follow-up to ACP Specialist referral from:Pinky Browning MD    [x] PCP  [] Provider   [] Ambulatory Care Management [] Other     For:                  [x] Advance Directive Assistance              [] Complete Portable DNR order              [] Complete POST/POLST/MOST              [] Code Status Discussion             [] Discuss Goals of Care             [x] Early ACP Decision-Making              [] Other (Specify)    Date Referral Received:5/8/2024    Next Step:   [x] ACP scheduled conversation  [] Outreach again in one week               [] Email / Mail ACP Info Sheets  [] Email / Mail Advance Directive   [] Closing referral.  Routing closure to referring provider/staff and to ACP Specialist .    [] Closure letter mailed to patient with invitation to contact ACP Specialist if / when ready.   [] Other (Specify here):         [x] At this time, Healthcare Decision Maker Is:        [] Primary agent named in scanned advance directive.    [] Legal Next of Kin.     [x] Unable to determine legal decision maker at this time.         Outreaches:       [x] 1st -  Date:  5/8/2024               Intervention:  [x] Spoke with Patient   [] Left Voice mail [] Email / Mail    [] [employer]  [] Other (Specify) :     Outcomes: ACP Specialist made an initial outreach telephone call to patient's home number listed in the medical record to offer patient an Advance Care Planning appointment. , [place] ( ID #[id number]) was used to assist with communication with patient.  Pt offered ACP appointment and is open to participating in a telephone appointment which has been scheduled for Friday, May 17, 2024 at 12:30pm. Informational materials have been emailed to patient for review prior to appointment date.     Thank you for this

## 2024-05-17 ENCOUNTER — CLINICAL DOCUMENTATION (OUTPATIENT)
Dept: CASE MANAGEMENT | Age: 74
End: 2024-05-17

## 2024-05-17 NOTE — ACP (ADVANCE CARE PLANNING)
Advance Care Planning     Advance Care Planning Clinical Specialist  Conversation Note      Date of ACP Conversation: 5/17/2024    Conversation Conducted with: Patient with Decision Making Capacity and Legal next of kin - Spouse, Leopoldo Pacas Lopez and  Saida ID#[id number]     ACP Clinical Specialist: Christianne Razo LCSW    Healthcare Decision Maker:     Current Designated Healthcare Decision Maker:     Primary Decision Maker: Anam Guzman - Child - [phone]    Primary Decision Maker: Nish Nathanael,Leopoldo - Spouse - [phone]    Care Preferences      Ventilation:  \"If you were in your present state of health and suddenly became very ill and were unable to breathe on your own, what would your preference be about the use of a ventilator (breathing machine) if it were available to you?\"      If the patient would desire the use of ventilator (breathing machine), answer \"yes\".  If not, \"no\":  Pt reports that she is not ready to discuss any health care decisions, patient only wanting to name health care decision maker during this appointment.     \"If your health worsens and it becomes clear that your chance of recovery is unlikely, what would your preference be about the use of a ventilator (breathing machine) if it were available to you?\"     Would the patient desire the use of ventilator (breathing machine)?: Pt reports that she is not ready to discuss any health care decisions, patient only wanting to name health care decision maker during this appointment.      Resuscitation  \"CPR works best to restart the heart when there is a sudden event, like a heart attack, in someone who is otherwise healthy. Unfortunately, CPR does not typically restart the heart for people who have serious health conditions or who are very sick.\"    \"In the event your heart stopped as a result of an underlying serious health condition, would you want attempts to be made to restart your heart (answer \"yes\" for

## 2024-05-29 ENCOUNTER — HOSPITAL ENCOUNTER (OUTPATIENT)
Facility: HOSPITAL | Age: 74
Discharge: HOME | End: 2024-06-01
Attending: FAMILY MEDICINE
Payer: COMMERCIAL

## 2024-05-29 DIAGNOSIS — S46.011A TRAUMATIC TEAR OF RIGHT ROTATOR CUFF, INITIAL ENCOUNTER: ICD-10-CM

## 2024-05-29 PROCEDURE — 73221 MRI JOINT UPR EXTREM W/O DYE: CPT

## 2024-05-31 ENCOUNTER — CLINICAL DOCUMENTATION (OUTPATIENT)
Dept: CASE MANAGEMENT | Age: 74
End: 2024-05-31

## 2024-06-06 ENCOUNTER — OFFICE VISIT (OUTPATIENT)
Age: 74
End: 2024-06-06

## 2024-06-06 ENCOUNTER — OFFICE VISIT (OUTPATIENT)
Age: 74
End: 2024-06-06
Payer: COMMERCIAL

## 2024-06-06 VITALS
RESPIRATION RATE: 18 BRPM | DIASTOLIC BLOOD PRESSURE: 64 MMHG | WEIGHT: 179 LBS | OXYGEN SATURATION: 97 % | HEIGHT: 61 IN | TEMPERATURE: 98 F | SYSTOLIC BLOOD PRESSURE: 134 MMHG | BODY MASS INDEX: 33.79 KG/M2 | HEART RATE: 77 BPM

## 2024-06-06 DIAGNOSIS — Z59.9 FINANCIAL DIFFICULTIES: ICD-10-CM

## 2024-06-06 DIAGNOSIS — M19.90 ARTHRITIS: ICD-10-CM

## 2024-06-06 DIAGNOSIS — Z60.3 LANGUAGE BARRIER AFFECTING HEALTH CARE: ICD-10-CM

## 2024-06-06 DIAGNOSIS — Z59.7: Primary | ICD-10-CM

## 2024-06-06 DIAGNOSIS — F32.A ANXIETY AND DEPRESSION: Primary | ICD-10-CM

## 2024-06-06 DIAGNOSIS — Z59.9 FINANCIAL DIFFICULTY: ICD-10-CM

## 2024-06-06 DIAGNOSIS — Z75.8 LANGUAGE BARRIER AFFECTING HEALTH CARE: ICD-10-CM

## 2024-06-06 DIAGNOSIS — F41.9 ANXIETY AND DEPRESSION: Primary | ICD-10-CM

## 2024-06-06 PROCEDURE — 99213 OFFICE O/P EST LOW 20 MIN: CPT

## 2024-06-06 PROCEDURE — 1123F ACP DISCUSS/DSCN MKR DOCD: CPT

## 2024-06-06 SDOH — ECONOMIC STABILITY: HOUSING INSECURITY
IN THE LAST 12 MONTHS, WAS THERE A TIME WHEN YOU DID NOT HAVE A STEADY PLACE TO SLEEP OR SLEPT IN A SHELTER (INCLUDING NOW)?: NO

## 2024-06-06 SDOH — ECONOMIC STABILITY: INCOME INSECURITY: HOW HARD IS IT FOR YOU TO PAY FOR THE VERY BASICS LIKE FOOD, HOUSING, MEDICAL CARE, AND HEATING?: VERY HARD

## 2024-06-06 SDOH — ECONOMIC STABILITY: FOOD INSECURITY: WITHIN THE PAST 12 MONTHS, YOU WORRIED THAT YOUR FOOD WOULD RUN OUT BEFORE YOU GOT MONEY TO BUY MORE.: OFTEN TRUE

## 2024-06-06 SDOH — ECONOMIC STABILITY: FOOD INSECURITY: WITHIN THE PAST 12 MONTHS, THE FOOD YOU BOUGHT JUST DIDN'T LAST AND YOU DIDN'T HAVE MONEY TO GET MORE.: OFTEN TRUE

## 2024-06-06 SDOH — ECONOMIC STABILITY: FOOD INSECURITY: WITHIN THE PAST 12 MONTHS, THE FOOD YOU BOUGHT JUST DIDN'T LAST AND YOU DIDN'T HAVE MONEY TO GET MORE.: NEVER TRUE

## 2024-06-06 SDOH — ECONOMIC STABILITY: TRANSPORTATION INSECURITY
IN THE PAST 12 MONTHS, HAS LACK OF TRANSPORTATION KEPT YOU FROM MEETINGS, WORK, OR FROM GETTING THINGS NEEDED FOR DAILY LIVING?: NO

## 2024-06-06 SDOH — ECONOMIC STABILITY: FOOD INSECURITY: WITHIN THE PAST 12 MONTHS, YOU WORRIED THAT YOUR FOOD WOULD RUN OUT BEFORE YOU GOT MONEY TO BUY MORE.: NEVER TRUE

## 2024-06-06 SDOH — ECONOMIC STABILITY: TRANSPORTATION INSECURITY
IN THE PAST 12 MONTHS, HAS THE LACK OF TRANSPORTATION KEPT YOU FROM MEDICAL APPOINTMENTS OR FROM GETTING MEDICATIONS?: NO

## 2024-06-06 SDOH — ECONOMIC STABILITY - INCOME SECURITY: PROBLEM RELATED TO HOUSING AND ECONOMIC CIRCUMSTANCES, UNSPECIFIED: Z59.9

## 2024-06-06 SDOH — ECONOMIC STABILITY: INCOME INSECURITY: IN THE LAST 12 MONTHS, WAS THERE A TIME WHEN YOU WERE NOT ABLE TO PAY THE MORTGAGE OR RENT ON TIME?: NO

## 2024-06-06 SDOH — SOCIAL STABILITY - SOCIAL INSECURITY: ACCULTURATION DIFFICULTY: Z60.3

## 2024-06-06 SDOH — ECONOMIC STABILITY - INCOME SECURITY: INSUFFICIENT SOCIAL INSURANCE AND WELFARE SUPPORT: Z59.7

## 2024-06-06 ASSESSMENT — PATIENT HEALTH QUESTIONNAIRE - PHQ9
8. MOVING OR SPEAKING SO SLOWLY THAT OTHER PEOPLE COULD HAVE NOTICED. OR THE OPPOSITE, BEING SO FIGETY OR RESTLESS THAT YOU HAVE BEEN MOVING AROUND A LOT MORE THAN USUAL: NEARLY EVERY DAY
6. FEELING BAD ABOUT YOURSELF - OR THAT YOU ARE A FAILURE OR HAVE LET YOURSELF OR YOUR FAMILY DOWN: NOT AT ALL
SUM OF ALL RESPONSES TO PHQ QUESTIONS 1-9: 8
SUM OF ALL RESPONSES TO PHQ QUESTIONS 1-9: 8
SUM OF ALL RESPONSES TO PHQ9 QUESTIONS 1 & 2: 3
7. TROUBLE CONCENTRATING ON THINGS, SUCH AS READING THE NEWSPAPER OR WATCHING TELEVISION: NOT AT ALL
4. FEELING TIRED OR HAVING LITTLE ENERGY: NOT AT ALL
3. TROUBLE FALLING OR STAYING ASLEEP: NOT AT ALL
5. POOR APPETITE OR OVEREATING: MORE THAN HALF THE DAYS
SUM OF ALL RESPONSES TO PHQ QUESTIONS 1-9: 8
SUM OF ALL RESPONSES TO PHQ QUESTIONS 1-9: 8
2. FEELING DOWN, DEPRESSED OR HOPELESS: SEVERAL DAYS
1. LITTLE INTEREST OR PLEASURE IN DOING THINGS: MORE THAN HALF THE DAYS

## 2024-06-06 ASSESSMENT — SOCIAL DETERMINANTS OF HEALTH (SDOH): HOW HARD IS IT FOR YOU TO PAY FOR THE VERY BASICS LIKE FOOD, HOUSING, MEDICAL CARE, AND HEATING?: NOT VERY HARD

## 2024-06-06 NOTE — PROGRESS NOTES
Psychosocial Assessment    Reason for Assessment:    [x] Social Work Navigator Referral [] Initial Assessment - OB [x] Initial Assessment - GEN   [] +SDOH [] Other    Relationship Status:  []Single  [x]  []Significant Other/Life Partner  []  []  []    Living Circumstances:  []Lives Alone  [x]Family/Significant Other in Household  []Roommates  []Children in the Home  []Paid Caregivers  []Assisted Living Facility/Group Home  []Skilled Nursing Facility  []Homeless  []Environmental/Care Concerns  []Other:    Education:  []Elementary   []Middle School []High School   []Some College  []College Grad []Did not attend school      Employment Status:  []Employed Full-time []Employed Part-time []Homemaker  [] Retired [] Short-Term Disability [] Long-Term Disability  [] Unemployed   [] SSI   [] SSDI  [] Self-Employment    Barriers to Learning:    [x]Language  []Developmental  []Cognitive  []Altered Mental Status  []Visual/Hearing Impairment  []Unable to Read/Write   []No Barriers Identified      Financial/Legal Concerns:    []Uninsured  [x]Limited Income/Resources  []Non-Citizen  []Food Insecurity   []No Concerns Identified   []Other:    Support System:    Identified Support Person/Group:  [x]Strong  []Fair  []Limited    Review of SDOH:   Patient denies social needs at present time    Screening:   []  PHQ2  [] PHQ9 [] EPDS [] ACE [x] No screening    Personal Safety:  Hx of Domestic violence - Hx of domestic violence [] Yes [x] No  General safety - Do you feel safe in your relationship, in your home, in your neighborhood   [x] Yes [] No           Patient is 73 yo female referred by physician to SW navigator for financial assistance r/t medical bills. Parent reports under stress related to financial concerns. Patient receives SSI; however income is utilized to pay housing expenses which take over most of the income. Patient reports received notice about medical bill (not on hand during visit).

## 2024-06-06 NOTE — PATIENT INSTRUCTIONS
Recursos alimentarios del área de [place]*  (Llame al 211 si necesita más recursos)    SNAP (anteriormente Food [place])   Lo que ofrecen: SNAP se utiliza jitendra dinero en efectivo para comprar productos alimenticios elegibles de minoristas autorizados.  Solicite los beneficios en línea: https://www.[facility].[place].gov/.  Solicite los beneficios por teléfono: [phone] (lunes a viernes de 8 a. m. a 7 p. m.; sábados de 9 a. m. a 12 p. m.).    Línea directa de asistencia alimentaria de emergencia de [employer]   Lo que ofrecen: asistencia para encontrar bancos de alimento, comedores comunitarios o recursos cerca de usted.  Sitio web: https://[employer].[place]/agency-network/agency-/ (buscar por código postal).  Formulario de consulta de la línea directa contra el hambre: https://[employer].org/hunger-hotline-inquiry-form/.  Número de teléfono de la línea directa contra el hambre: [phone] x [phone] (lunes a viernes de 8:00 a. m. a 4:00 p. m.).    Meals on Wheels   Meals on Wheels es un programa que entrega comidas a personas que no cuentan con un medio confiable para mantener jessie dieta saludable.  Los servicios se prestan por zonas.     Área de servicio de [facility]:   [facility], [facility], [place] y [place].  Condados de [place], [place], [place], [place], [place], [place], [place], [place], [place] y [place]  Sitio web: https://[employer].org/how-we-help/meals-on-wheels/  Para solicitar el programa:  De 18 a 59 años de edad:  Solicítelo en línea: https://[employer].org/meals-on-wheels-application-form/.  Solicítelo por teléfono: [phone]    Para solicitar el programa:  60 años de edad o más:  Solicítelo en línea: https://[employer].org/meals-on-wheels-application-form/.  Solicítelo por teléfono: [phone] (L-V de 8:30 a. m. a 5:00 p. m.).      Para la ciudad de [place] y los condados de [place], [place], [place], [place], [place], [place] y [place]:

## 2024-06-06 NOTE — PROGRESS NOTES
Pt roomed by name and .    Session # 05871  Int # [id number] (Yaritza)    Chief Complaint   Patient presents with    Depression    Skin Problem     Itching but no rash        Vitals:    24 1352   BP: 134/64   Pulse: 77   Resp: 18   Temp: 98 °F (36.7 °C)   TempSrc: Temporal   SpO2: 97%   Weight: 81.2 kg (179 lb)   Height: 1.54 m (5' 0.63\")          \"Have you been to the ER, urgent care clinic since your last visit?  Hospitalized since your last visit?\"    NO    “Have you seen or consulted any other health care providers outside of [facility] since your last visit?”    NO            Click Here for Release of Records Request

## 2024-06-11 NOTE — PROGRESS NOTES
[address] [place] [address]   Office [phone], Fax [phone]    Subjective   Rachael Guzman is a 74 y.o. female who presents for depression follow up.    #Depression  - Started on citalopram 1 month ago  - Feels like its helping; feels less like crying all the time  - Denies side effects of drowsiness, tremor, tachycardia, palpitations, N/V.  - PHQ-9 = 8 (little interest/pleasure in doing things, feeling down/depressed, poor appetite, psychomotor slowing)  - Pt not interested in changing her medication dose  - States she is eating well   - Stressors: health; anxiety about the future  - Supportive factors: ; children, she likes to cook  - Activities: does not go outside / walk much because of pain in her legs  - Seeing orthopedic surgeon next week  - Not taking her naproxen anymore for arthritis  - Counselor: none  - Pt is a poor historian; likely related to pt's low literacy level and language barrier  - No SI/HI, no panic attacks    #Financial difficulty  - Would like to speak with  for assistance     Review of Systems   Pertinent noted above.    Medical History  Past Medical History:   Diagnosis Date    Anxiety     Hypercholesterolemia     Hypertension     Menopause      Medications  Current Outpatient Medications   Medication Sig    hydroCHLOROthiazide (HYDRODIURIL) 25 MG tablet Take 1 tablet by mouth daily    atorvastatin (LIPITOR) 20 MG tablet Take 1 tablet by mouth daily    citalopram (CELEXA) 10 MG tablet Take 1 tablet by mouth daily    famotidine (PEPCID) 20 MG tablet Take 1 tablet by mouth 2 times daily    gabapentin (NEURONTIN) 100 MG capsule Take 1 capsule by mouth 3 times daily. As needed    naproxen (NAPROSYN) 500 MG tablet Take 1 tablet by mouth every 12 hours as needed for Pain (Patient not taking: Reported on 5/7/2024)    irbesartan (AVAPRO) 150 MG tablet Take 1 tablet by mouth nightly (Patient not taking: Reported on 7/24/2023)     No current

## 2024-06-14 NOTE — PROGRESS NOTES
I reviewed the patient's medical history, the resident's findings on physical examination, the patient's diagnoses, and treatment plan as documented in the resident note.  I concur with the treatment plan as documented.

## 2024-07-29 ENCOUNTER — OFFICE VISIT (OUTPATIENT)
Age: 74
End: 2024-07-29
Payer: MEDICARE

## 2024-07-29 VITALS
BODY MASS INDEX: 34.81 KG/M2 | HEART RATE: 72 BPM | SYSTOLIC BLOOD PRESSURE: 115 MMHG | OXYGEN SATURATION: 96 % | TEMPERATURE: 98 F | WEIGHT: 182 LBS | DIASTOLIC BLOOD PRESSURE: 76 MMHG

## 2024-07-29 DIAGNOSIS — E78.2 MIXED HYPERLIPIDEMIA: ICD-10-CM

## 2024-07-29 DIAGNOSIS — I10 ESSENTIAL (PRIMARY) HYPERTENSION: ICD-10-CM

## 2024-07-29 DIAGNOSIS — K21.9 GASTROESOPHAGEAL REFLUX DISEASE, UNSPECIFIED WHETHER ESOPHAGITIS PRESENT: Primary | ICD-10-CM

## 2024-07-29 LAB
CHOLEST SERPL-MCNC: 155 MG/DL
HDLC SERPL-MCNC: 47 MG/DL
HDLC SERPL: 3.3 (ref 0–5)
LDLC SERPL CALC-MCNC: 69.4 MG/DL (ref 0–100)
TRIGL SERPL-MCNC: 193 MG/DL
VLDLC SERPL CALC-MCNC: 38.6 MG/DL

## 2024-07-29 PROCEDURE — 3078F DIAST BP <80 MM HG: CPT

## 2024-07-29 PROCEDURE — 3074F SYST BP LT 130 MM HG: CPT

## 2024-07-29 PROCEDURE — 1123F ACP DISCUSS/DSCN MKR DOCD: CPT

## 2024-07-29 PROCEDURE — 99214 OFFICE O/P EST MOD 30 MIN: CPT

## 2024-07-29 RX ORDER — OMEPRAZOLE 20 MG/1
20 CAPSULE, DELAYED RELEASE ORAL
Qty: 42 CAPSULE | Refills: 0 | Status: SHIPPED | OUTPATIENT
Start: 2024-07-29 | End: 2024-09-09

## 2024-07-29 RX ORDER — HYDROCHLOROTHIAZIDE 25 MG/1
25 TABLET ORAL DAILY
Qty: 90 TABLET | Refills: 1 | Status: SHIPPED | OUTPATIENT
Start: 2024-07-29

## 2024-07-29 RX ORDER — ATORVASTATIN CALCIUM 20 MG/1
20 TABLET, FILM COATED ORAL DAILY
Qty: 30 TABLET | Refills: 0 | Status: SHIPPED | OUTPATIENT
Start: 2024-07-29

## 2024-07-29 NOTE — PROGRESS NOTES
Identified pt with two pt identifiers(name and ). Reviewed record in preparation for visit and have obtained necessary documentation.  Chief Complaint   Patient presents with    Medication Refill     Atorvastatin, hctz        Vitals:    24 0809   BP: 115/76   Pulse: 72   Temp: 98 °F (36.7 °C)   TempSrc: Oral   SpO2: 96%   Weight: 82.6 kg (182 lb)         Coordination of Care Questionnaire:  :     \"Have you been to the ER, urgent care clinic since your last visit?  Hospitalized since your last visit?\"    NO    “Have you seen or consulted any other health care providers outside of [facility] since your last visit?”    NO            Click Here for Release of Records Request

## 2024-07-29 NOTE — PROGRESS NOTES
[address] [place] [address]   Office [phone], Fax [phone]      Chief Complaint:     Rachael Guzman is a 74 y.o. female that presents for:   Chief Complaint   Patient presents with    Medication Refill     Atorvastatin, hctz       Subjective:   HPI:    Patient is present with her  Leopoldo. Patient reports today that she has itchiness in her throat accompanied by dry cough.     Patient has had this cough for about 1 month. She states that it is worse in the morning and often comes in \"fits\" which then resolve. Occasionally wakes with bad taste in mouth. Patient denies history of acid reflux but states she takes famotidine since a trip to the emergency room when she had epigastric pain. Has not had belly pain since ER visit. No smoking history or prior history of lung disease. Does not eat much spicy food.     Atorvastatin   Last lipid panel 11/14/22. Patient denies issues with this medication.   Diet, varied diet with vegetables, meats, some fried foods.     HCTZ  Takes for high blood pressure. Patient takes her blood pressure at home but does not know how to read so usually her son helps her. She does not have records of her blood pressures. Denies dizziness, headaches, blurry vision.     Past medical history, social history, medications, and allergies personally reviewed.  Past Medical History:   Diagnosis Date    Anxiety     Hypercholesterolemia     Hypertension     Menopause         Social Hx:   Social History     Socioeconomic History    Marital status:    Tobacco Use    Smoking status: Never    Smokeless tobacco: Never   Substance and Sexual Activity    Alcohol use: Never    Drug use: Never    Sexual activity: Defer     Social Determinants of Health     Financial Resource Strain: Low Risk  (6/6/2024)    Overall Financial Resource Strain (CARDIA)     Difficulty of Paying Living Expenses: Not very hard   Recent Concern: Financial Resource Strain - High Risk (6/6/2024)

## 2024-08-05 ENCOUNTER — HOSPITAL ENCOUNTER (EMERGENCY)
Facility: HOSPITAL | Age: 74
Discharge: HOME | End: 2024-08-05
Attending: EMERGENCY MEDICINE
Payer: MEDICARE

## 2024-08-05 ENCOUNTER — APPOINTMENT (OUTPATIENT)
Facility: HOSPITAL | Age: 74
End: 2024-08-05
Payer: MEDICARE

## 2024-08-05 VITALS
OXYGEN SATURATION: 98 % | TEMPERATURE: 98 F | RESPIRATION RATE: 20 BRPM | SYSTOLIC BLOOD PRESSURE: 155 MMHG | HEART RATE: 78 BPM | WEIGHT: 180 LBS | DIASTOLIC BLOOD PRESSURE: 81 MMHG | BODY MASS INDEX: 35.34 KG/M2 | HEIGHT: 60 IN

## 2024-08-05 DIAGNOSIS — R42 DIZZINESS: ICD-10-CM

## 2024-08-05 DIAGNOSIS — R07.9 CHEST PAIN, UNSPECIFIED TYPE: Primary | ICD-10-CM

## 2024-08-05 LAB
ALBUMIN SERPL-MCNC: 3.9 G/DL (ref 3.5–5.2)
ALBUMIN/GLOB SERPL: 1.2 (ref 1.1–2.2)
ALP SERPL-CCNC: 108 U/L (ref 35–104)
ALT SERPL-CCNC: 9 U/L (ref 10–35)
ANION GAP SERPL CALC-SCNC: 13 MMOL/L (ref 5–15)
APPEARANCE UR: CLEAR
AST SERPL-CCNC: 14 U/L (ref 10–35)
BACTERIA URNS QL MICRO: NEGATIVE /HPF
BASOPHILS # BLD: 0.1 K/UL (ref 0–1)
BASOPHILS NFR BLD: 1 % (ref 0–1)
BILIRUB SERPL-MCNC: 0.3 MG/DL (ref 0.2–1)
BILIRUB UR QL: NEGATIVE
BUN SERPL-MCNC: 17 MG/DL (ref 8–23)
BUN/CREAT SERPL: 28 (ref 12–20)
CALCIUM SERPL-MCNC: 8.9 MG/DL (ref 8.8–10.2)
CHLORIDE SERPL-SCNC: 107 MMOL/L (ref 98–107)
CO2 SERPL-SCNC: 26 MMOL/L (ref 22–29)
COLOR UR: NORMAL
CREAT SERPL-MCNC: 0.61 MG/DL (ref 0.5–0.9)
D DIMER PPP FEU-MCNC: 0.71 UG/ML(FEU)
DIFFERENTIAL METHOD BLD: ABNORMAL
EKG ATRIAL RATE: 79 BPM
EKG DIAGNOSIS: NORMAL
EKG P AXIS: 31 DEGREES
EKG P-R INTERVAL: 132 MS
EKG Q-T INTERVAL: 368 MS
EKG QRS DURATION: 80 MS
EKG QTC CALCULATION (BAZETT): 421 MS
EKG R AXIS: 19 DEGREES
EKG T AXIS: -2 DEGREES
EKG VENTRICULAR RATE: 79 BPM
EOSINOPHIL # BLD: 0.3 K/UL (ref 0–0.4)
EOSINOPHIL NFR BLD: 4 %
EPITH CASTS URNS QL MICRO: NORMAL /LPF
ERYTHROCYTE [DISTWIDTH] IN BLOOD BY AUTOMATED COUNT: 13.7 % (ref 11.5–14.5)
FLUAV RNA SPEC QL NAA+PROBE: NOT DETECTED
FLUBV RNA SPEC QL NAA+PROBE: NOT DETECTED
GLOBULIN SER CALC-MCNC: 3.3 G/DL (ref 2–4)
GLUCOSE SERPL-MCNC: 159 MG/DL (ref 65–100)
GLUCOSE UR STRIP.AUTO-MCNC: NEGATIVE MG/DL
HCT VFR BLD AUTO: 36.7 % (ref 35–47)
HGB BLD-MCNC: 11.9 G/DL (ref 11.5–16)
HGB UR QL STRIP: NEGATIVE
IMM GRANULOCYTES # BLD AUTO: 0 K/UL (ref 0–0.04)
IMM GRANULOCYTES NFR BLD AUTO: 0 % (ref 0–0.5)
KETONES UR QL STRIP.AUTO: NEGATIVE MG/DL
LEUKOCYTE ESTERASE UR QL STRIP.AUTO: NEGATIVE
LYMPHOCYTES # BLD: 2.9 K/UL (ref 0.8–3.5)
LYMPHOCYTES NFR BLD: 30 % (ref 12–49)
MAGNESIUM SERPL-MCNC: 2.1 MG/DL (ref 1.6–2.4)
MCH RBC QN AUTO: 30.1 PG (ref 26–34)
MCHC RBC AUTO-ENTMCNC: 32.4 G/DL (ref 30–36.5)
MCV RBC AUTO: 92.9 FL (ref 80–99)
MONOCYTES # BLD: 0.6 K/UL (ref 0–1)
MONOCYTES NFR BLD: 7 % (ref 5–13)
NEUTS SEG # BLD: 5.7 K/UL (ref 1.8–8)
NEUTS SEG NFR BLD: 58 % (ref 32–75)
NITRITE UR QL STRIP.AUTO: NEGATIVE
NRBC # BLD: 0 K/UL (ref 0–0.01)
NRBC BLD-RTO: 0 PER 100 WBC
PH UR STRIP: 7 (ref 5–8)
PLATELET # BLD AUTO: 295 K/UL (ref 150–400)
PMV BLD AUTO: 11.7 FL (ref 8.9–12.9)
POTASSIUM SERPL-SCNC: 4.1 MMOL/L (ref 3.5–5.1)
PROT SERPL-MCNC: 7.2 G/DL (ref 6.4–8.3)
PROT UR STRIP-MCNC: NEGATIVE MG/DL
RBC # BLD AUTO: 3.95 M/UL (ref 3.8–5.2)
RBC #/AREA URNS HPF: NORMAL /HPF
SARS-COV-2 RNA RESP QL NAA+PROBE: NOT DETECTED
SODIUM SERPL-SCNC: 146 MMOL/L (ref 136–145)
SP GR UR REFRACTOMETRY: 1.02 (ref 1–1.03)
TROPONIN T SERPL HS-MCNC: 7.7 NG/L (ref 0–14)
TROPONIN T SERPL HS-MCNC: 8.1 NG/L (ref 0–14)
UROBILINOGEN UR QL STRIP.AUTO: 0.2 EU/DL (ref 0.2–1)
WBC # BLD AUTO: 9.7 K/UL (ref 3.6–11)
WBC URNS QL MICRO: NORMAL /HPF (ref 0–4)

## 2024-08-05 PROCEDURE — 6370000000 HC RX 637 (ALT 250 FOR IP): Performed by: EMERGENCY MEDICINE

## 2024-08-05 PROCEDURE — 6360000002 HC RX W HCPCS: Performed by: EMERGENCY MEDICINE

## 2024-08-05 PROCEDURE — 2580000003 HC RX 258: Performed by: EMERGENCY MEDICINE

## 2024-08-05 PROCEDURE — 85025 COMPLETE CBC W/AUTO DIFF WBC: CPT

## 2024-08-05 PROCEDURE — 93005 ELECTROCARDIOGRAM TRACING: CPT | Performed by: EMERGENCY MEDICINE

## 2024-08-05 PROCEDURE — 99285 EMERGENCY DEPT VISIT HI MDM: CPT

## 2024-08-05 PROCEDURE — 85379 FIBRIN DEGRADATION QUANT: CPT

## 2024-08-05 PROCEDURE — 96374 THER/PROPH/DIAG INJ IV PUSH: CPT

## 2024-08-05 PROCEDURE — 84484 ASSAY OF TROPONIN QUANT: CPT

## 2024-08-05 PROCEDURE — 71045 X-RAY EXAM CHEST 1 VIEW: CPT

## 2024-08-05 PROCEDURE — 93010 ELECTROCARDIOGRAM REPORT: CPT | Performed by: SPECIALIST

## 2024-08-05 PROCEDURE — 81001 URINALYSIS AUTO W/SCOPE: CPT

## 2024-08-05 PROCEDURE — 87636 SARSCOV2 & INF A&B AMP PRB: CPT

## 2024-08-05 PROCEDURE — 83735 ASSAY OF MAGNESIUM: CPT

## 2024-08-05 PROCEDURE — 80053 COMPREHEN METABOLIC PANEL: CPT

## 2024-08-05 PROCEDURE — 36415 COLL VENOUS BLD VENIPUNCTURE: CPT

## 2024-08-05 RX ORDER — 0.9 % SODIUM CHLORIDE 0.9 %
1000 INTRAVENOUS SOLUTION INTRAVENOUS ONCE
Status: COMPLETED | OUTPATIENT
Start: 2024-08-05 | End: 2024-08-05

## 2024-08-05 RX ORDER — ONDANSETRON 2 MG/ML
4 INJECTION INTRAMUSCULAR; INTRAVENOUS EVERY 6 HOURS PRN
Status: DISCONTINUED | OUTPATIENT
Start: 2024-08-05 | End: 2024-08-05 | Stop reason: HOSPADM

## 2024-08-05 RX ORDER — MECLIZINE HYDROCHLORIDE 25 MG/1
25 TABLET ORAL
Status: COMPLETED | OUTPATIENT
Start: 2024-08-05 | End: 2024-08-05

## 2024-08-05 RX ORDER — ONDANSETRON 4 MG/1
4 TABLET, ORALLY DISINTEGRATING ORAL 3 TIMES DAILY PRN
Qty: 21 TABLET | Refills: 0 | Status: SHIPPED | OUTPATIENT
Start: 2024-08-05

## 2024-08-05 RX ADMIN — SODIUM CHLORIDE 1000 ML: 9 INJECTION, SOLUTION INTRAVENOUS at 04:34

## 2024-08-05 RX ADMIN — MECLIZINE HYDROCHLORIDE 25 MG: 25 TABLET ORAL at 04:37

## 2024-08-05 RX ADMIN — ONDANSETRON 4 MG: 2 INJECTION INTRAMUSCULAR; INTRAVENOUS at 04:37

## 2024-08-05 ASSESSMENT — LIFESTYLE VARIABLES
HOW MANY STANDARD DRINKS CONTAINING ALCOHOL DO YOU HAVE ON A TYPICAL DAY: PATIENT DOES NOT DRINK
HOW OFTEN DO YOU HAVE A DRINK CONTAINING ALCOHOL: NEVER

## 2024-08-05 ASSESSMENT — PAIN SCALES - GENERAL: PAINLEVEL_OUTOF10: 8

## 2024-08-05 ASSESSMENT — PAIN DESCRIPTION - ORIENTATION: ORIENTATION: LEFT

## 2024-08-05 ASSESSMENT — PAIN - FUNCTIONAL ASSESSMENT: PAIN_FUNCTIONAL_ASSESSMENT: 0-10

## 2024-08-05 ASSESSMENT — PAIN DESCRIPTION - DESCRIPTORS: DESCRIPTORS: ACHING

## 2024-08-05 ASSESSMENT — PAIN DESCRIPTION - LOCATION: LOCATION: CHEST;SHOULDER

## 2024-08-05 ASSESSMENT — PAIN DESCRIPTION - PAIN TYPE: TYPE: ACUTE PAIN

## 2024-08-05 NOTE — ED PROVIDER NOTES
[facility] EMERGENCY DEPT  EMERGENCY DEPARTMENT ENCOUNTER      Pt Name: Rachael Guzman  MRN: [id number]  Birthdate 1950  Date of evaluation: 8/5/2024  Provider: Dedrick Ramsey MD    CHIEF COMPLAINT       Chief Complaint   Patient presents with    Chest Pain    Dizziness    Shoulder Pain         HISTORY OF PRESENT ILLNESS   (Location/Symptom, Timing/Onset, Context/Setting, Quality, Duration, Modifying Factors, Severity)  Note limiting factors.   74-year-old female with PMHx of anxiety, hypertension presents to the emergency department with her son complaining of left anterior chest/shoulder pain yesterday since around noon today.  Patient son reports that the patient was walking at the [facility] in the mid day at onset of pain.  She notes that she went to bed but couldn't sleep and developed dizziness at 0100 with nausea and vomiting twice.  She denies shortness of breath, palpitations, abdominal pain, diarrhea.  She has no additional complaints at this time.      The history is provided by the patient and a relative.         Review of External Medical Records:     Nursing Notes were reviewed.    REVIEW OF SYSTEMS    (2-9 systems for level 4, 10 or more for level 5)     Review of Systems   Constitutional: Negative.    HENT: Negative.     Eyes: Negative.    Respiratory: Negative.     Cardiovascular:  Positive for chest pain.   Gastrointestinal:  Positive for nausea and vomiting.   Genitourinary: Negative.    Musculoskeletal:  Positive for arthralgias.   Skin: Negative.    Neurological:  Positive for dizziness.   Psychiatric/Behavioral: Negative.         Except as noted above the remainder of the review of systems was reviewed and negative.       PAST MEDICAL HISTORY     Past Medical History:   Diagnosis Date    Anxiety     Hypercholesterolemia     Hypertension     Menopause          SURGICAL HISTORY     History reviewed. No pertinent surgical history.      CURRENT MEDICATIONS       Previous Medications

## 2024-08-05 NOTE — ED NOTES
Pt given discharge instructions, patient education, prescriptions and follow up information. Pt verbalizes understanding. All questions answered. Pt discharged to home in private vehicle, in wheelchair. Pt A&Ox4, RA, pain controlled.

## 2024-08-05 NOTE — ED TRIAGE NOTES
Pt arrives with her son from home; developed left anterior chest/shoulder pain yesterday while walking at the [facility] in the mid day; also went to bed but couldn't sleep and developed dizziness at 0100 with nausea and vomiting twice; pt denies sob

## 2024-08-05 NOTE — DISCHARGE INSTRUCTIONS
You were seen in the emergency department for chest pain, dizziness, and nausea.  The results of your tests were reassuring.  Although an exact cause of your symptoms was not identified, the most likely cause is vertigo versus dehydration versus musculoskeletal pain. Please follow-up with your PCP and a cardiologist or return to the emergency department if you experience a worsening of symptoms or any new symptoms that are concerning to you.

## 2024-08-07 ASSESSMENT — ENCOUNTER SYMPTOMS
NAUSEA: 1
RESPIRATORY NEGATIVE: 1
VOMITING: 1
EYES NEGATIVE: 1

## 2024-08-20 DIAGNOSIS — E78.2 MIXED HYPERLIPIDEMIA: ICD-10-CM

## 2024-08-21 NOTE — TELEPHONE ENCOUNTER
Medication Refill Request    Rachael Guzman is requesting a refill of the following medication(s):   Requested Prescriptions     Pending Prescriptions Disp Refills    atorvastatin (LIPITOR) 20 MG tablet [Pharmacy Med Name: ATORVASTATIN 20 MG TABLET] 90 tablet 1     Sig: TAKE 1 TABLET BY MOUTH EVERY DAY        Listed PCP is Pinky Browning MD   Last provider to prescribe medication: Dr. Jarrett  Last Date of Medication Prescribed: 07/29/24   Date of Last Office Visit at [facility]: 07/29/24   FUTURE APPOINTMENT:   Future Appointments   Date Time Provider Department Center   9/9/2024 10:40 AM Pinky Browning MD [facility] DEP       Please send refill to:    [id number] - [place], [place] - [address] - P [phone] - F [phone] [address] [address]  Phone: [phone] Fax: [phone]      Please review request and approve or deny with recommendations.

## 2024-08-26 RX ORDER — ATORVASTATIN CALCIUM 20 MG/1
20 TABLET, FILM COATED ORAL DAILY
Qty: 90 TABLET | Refills: 1 | Status: SHIPPED | OUTPATIENT
Start: 2024-08-26

## 2024-09-06 DIAGNOSIS — K21.9 GASTROESOPHAGEAL REFLUX DISEASE, UNSPECIFIED WHETHER ESOPHAGITIS PRESENT: ICD-10-CM

## 2024-09-09 ENCOUNTER — OFFICE VISIT (OUTPATIENT)
Age: 74
End: 2024-09-09
Payer: MEDICARE

## 2024-09-09 VITALS
HEART RATE: 70 BPM | HEIGHT: 60 IN | WEIGHT: 186.6 LBS | SYSTOLIC BLOOD PRESSURE: 131 MMHG | TEMPERATURE: 97.8 F | BODY MASS INDEX: 36.63 KG/M2 | DIASTOLIC BLOOD PRESSURE: 80 MMHG | OXYGEN SATURATION: 94 % | RESPIRATION RATE: 16 BRPM

## 2024-09-09 DIAGNOSIS — Z01.818 ENCOUNTER FOR PREOPERATIVE ASSESSMENT: Primary | ICD-10-CM

## 2024-09-09 DIAGNOSIS — H25.9 AGE-RELATED CATARACT OF BOTH EYES, UNSPECIFIED AGE-RELATED CATARACT TYPE: ICD-10-CM

## 2024-09-09 PROCEDURE — 99213 OFFICE O/P EST LOW 20 MIN: CPT

## 2024-09-09 RX ORDER — OFLOXACIN 3 MG/ML
SOLUTION AURICULAR (OTIC)
COMMUNITY
Start: 2024-07-24

## 2024-09-09 RX ORDER — PREDNISOLONE ACETATE 10 MG/ML
SUSPENSION/ DROPS OPHTHALMIC
COMMUNITY
Start: 2024-07-22

## 2024-09-09 RX ORDER — LOSARTAN POTASSIUM 25 MG/1
25 TABLET ORAL DAILY
COMMUNITY
Start: 2024-09-04

## 2024-09-09 RX ORDER — MOXIFLOXACIN 5 MG/ML
SOLUTION/ DROPS OPHTHALMIC
COMMUNITY
Start: 2024-07-22

## 2024-10-22 ENCOUNTER — TRANSCRIBE ORDERS (OUTPATIENT)
Facility: HOSPITAL | Age: 74
End: 2024-10-22

## 2024-10-22 DIAGNOSIS — Z12.31 VISIT FOR SCREENING MAMMOGRAM: Primary | ICD-10-CM

## 2024-10-28 ENCOUNTER — HOSPITAL ENCOUNTER (OUTPATIENT)
Facility: HOSPITAL | Age: 74
Discharge: HOME | End: 2024-10-31
Payer: MEDICARE

## 2024-10-28 VITALS — HEIGHT: 60 IN | WEIGHT: 186 LBS | BODY MASS INDEX: 36.52 KG/M2

## 2024-10-28 DIAGNOSIS — Z12.31 VISIT FOR SCREENING MAMMOGRAM: ICD-10-CM

## 2024-10-28 PROCEDURE — 77063 BREAST TOMOSYNTHESIS BI: CPT
